# Patient Record
Sex: FEMALE | Race: WHITE | NOT HISPANIC OR LATINO | Employment: FULL TIME | URBAN - METROPOLITAN AREA
[De-identification: names, ages, dates, MRNs, and addresses within clinical notes are randomized per-mention and may not be internally consistent; named-entity substitution may affect disease eponyms.]

---

## 2021-01-26 ENCOUNTER — OFFICE VISIT (OUTPATIENT)
Dept: FAMILY MEDICINE CLINIC | Facility: CLINIC | Age: 66
End: 2021-01-26
Payer: COMMERCIAL

## 2021-01-26 VITALS
WEIGHT: 207.4 LBS | BODY MASS INDEX: 32.55 KG/M2 | DIASTOLIC BLOOD PRESSURE: 80 MMHG | HEIGHT: 67 IN | OXYGEN SATURATION: 98 % | SYSTOLIC BLOOD PRESSURE: 140 MMHG | TEMPERATURE: 97.8 F | HEART RATE: 82 BPM

## 2021-01-26 DIAGNOSIS — Z12.31 ENCOUNTER FOR SCREENING MAMMOGRAM FOR MALIGNANT NEOPLASM OF BREAST: ICD-10-CM

## 2021-01-26 DIAGNOSIS — E55.9 VITAMIN D DEFICIENCY: ICD-10-CM

## 2021-01-26 DIAGNOSIS — E03.9 ACQUIRED HYPOTHYROIDISM: ICD-10-CM

## 2021-01-26 DIAGNOSIS — I10 ESSENTIAL HYPERTENSION: Primary | ICD-10-CM

## 2021-01-26 DIAGNOSIS — E53.8 VITAMIN B12 DEFICIENCY: ICD-10-CM

## 2021-01-26 DIAGNOSIS — E78.2 MIXED HYPERLIPIDEMIA: ICD-10-CM

## 2021-01-26 DIAGNOSIS — J45.20 MILD INTERMITTENT ASTHMA, UNSPECIFIED WHETHER COMPLICATED: ICD-10-CM

## 2021-01-26 PROCEDURE — 99214 OFFICE O/P EST MOD 30 MIN: CPT | Performed by: FAMILY MEDICINE

## 2021-01-26 RX ORDER — FENOFIBRATE 160 MG/1
160 TABLET ORAL DAILY
COMMUNITY
Start: 2020-12-17 | End: 2022-04-13 | Stop reason: SDUPTHER

## 2021-01-26 RX ORDER — LEVOTHYROXINE SODIUM 200 MCG
200 TABLET ORAL DAILY
COMMUNITY
Start: 2021-01-11 | End: 2022-04-13 | Stop reason: SDUPTHER

## 2021-01-26 RX ORDER — LISINOPRIL AND HYDROCHLOROTHIAZIDE 12.5; 1 MG/1; MG/1
1 TABLET ORAL DAILY
COMMUNITY
Start: 2020-12-17 | End: 2022-04-13 | Stop reason: SDUPTHER

## 2021-01-26 RX ORDER — ALBUTEROL SULFATE 90 UG/1
2 AEROSOL, METERED RESPIRATORY (INHALATION) EVERY 6 HOURS PRN
Qty: 3 INHALER | Refills: 0 | Status: SHIPPED | OUTPATIENT
Start: 2021-01-26 | End: 2022-08-01 | Stop reason: SDUPTHER

## 2021-01-26 NOTE — PROGRESS NOTES
Assessment/Plan:     Diagnoses and all orders for this visit:    Essential hypertension  -     Comprehensive metabolic panel; Future  -     Lipid Panel with Direct LDL reflex; Future    Mixed hyperlipidemia  -     Comprehensive metabolic panel; Future  -     Lipid Panel with Direct LDL reflex; Future    Acquired hypothyroidism  -     TSH, 3rd generation; Future    Vitamin D deficiency  -     Vitamin D 25 hydroxy; Future    Vitamin B12 deficiency  -     Vitamin B12; Future    Encounter for screening mammogram for malignant neoplasm of breast  -     Mammo screening bilateral w 3d & cad; Future    Mild intermittent asthma, unspecified whether complicated  -     Advair Diskus 100-50 MCG/DOSE inhaler; Inhale 1 puff 2 (two) times a day Rinse mouth after use  -     albuterol (PROVENTIL HFA,VENTOLIN HFA) 90 mcg/act inhaler; Inhale 2 puffs every 6 (six) hours as needed for wheezing or shortness of breath PROVENTIL PREFERRED    Other orders  -     fenofibrate (TRIGLIDE) 160 MG tablet  -     Discontinue: Advair Diskus 100-50 MCG/DOSE inhaler; TAKE 1 PUFF BY MOUTH EVERY 12 HOURS IN THE MORNING AND IN THE EVENING  -     Synthroid 200 MCG tablet  -     lisinopril-hydrochlorothiazide (PRINZIDE,ZESTORETIC) 10-12 5 MG per tablet          Continue current treatment and encourage lifestyle dietary modification to improve BMI  Formal follow-up after blood work in 6 weeks  Subjective:   Chief Complaint   Patient [facility] with   [address] [facility] [address] [facility]     needs new prescription for albuterol     Bunions    Anxiety      Patient ID: Rei Velasquez is a 63 y o  female  Patient is here to establish care  Diagnoses and medical records discussed  Update on lab work discussed  Refills as appropriate  Patient has areas as noted below  Patient had seen Bora Grubbs MD at [facility]  We do have scanned copies of medical records  Trigger fingers index and middle  Arthritis in feet both  Overdue on pap     Mammogram ordered  Walks and weight training  HTN and hypothyroid  Is compliant with medication  Patient did have 1st COVID vaccination January 10th  She had flu shot September 2020  The following portions of the patient's history were reviewed and updated as appropriate: allergies, current medications, past family history, past medical history, past social history, past surgical history and problem list       Review of Systems   Constitutional: Negative for fatigue and unexpected weight change ( needs to lose)  Genitourinary:        Overdue on Pap   Musculoskeletal: Positive for arthralgias ( trigger finger index and middle also arthritis in both feet  )  Psychiatric/Behavioral: Negative  Negative for dysphoric mood and sleep disturbance  The patient is not nervous/anxious  Objective:      /80   Pulse 82   Temp 97 8 °F (36 6 °C) (Temporal)   Ht 5' 7" (1 702 m)   Wt 94 1 kg (207 lb 6 4 oz)   SpO2 98%   BMI 32 48 kg/m²          Physical Exam  Constitutional:       General: She is not in acute distress  Appearance: Normal appearance  She is well-developed  She is obese  HENT:      Head: Normocephalic  Right Ear: Tympanic membrane normal       Left Ear: Tympanic membrane normal       Nose: Nose normal    Eyes:      Pupils: Pupils are equal, round, and reactive to light  Neck:      Musculoskeletal: Normal range of motion and neck supple  Vascular: No carotid bruit  Cardiovascular:      Rate and Rhythm: Normal rate and regular rhythm  Heart sounds: No murmur  Pulmonary:      Effort: Pulmonary effort is normal       Breath sounds: Normal breath sounds  Abdominal:      General: Bowel sounds are normal       Palpations: Abdomen is soft  There is no mass  Tenderness: There is no abdominal tenderness  Comments: No abdominal or femoral bruit   Skin:     General: Skin is warm and dry     Neurological:      Mental Status: She is alert and oriented to person, place, and time       Deep Tendon Reflexes: Reflexes are normal and symmetric  Psychiatric:         Behavior: Behavior normal          Thought Content:  Thought content normal          Judgment: Judgment normal

## 2021-04-22 LAB
25(OH)D3 SERPL-MCNC: 21 NG/ML (ref 30–100)
ALBUMIN SERPL-MCNC: 4.2 G/DL (ref 3.6–5.1)
ALBUMIN/GLOB SERPL: 1.5 (CALC) (ref 1–2.5)
ALP SERPL-CCNC: 51 U/L (ref 37–153)
ALT SERPL-CCNC: 23 U/L (ref 6–29)
AST SERPL-CCNC: 18 U/L (ref 10–35)
BILIRUB SERPL-MCNC: 0.3 MG/DL (ref 0.2–1.2)
BUN SERPL-MCNC: 21 MG/DL (ref 7–25)
BUN/CREAT SERPL: ABNORMAL (CALC) (ref 6–22)
CALCIUM SERPL-MCNC: 9.7 MG/DL (ref 8.6–10.4)
CHLORIDE SERPL-SCNC: 105 MMOL/L (ref 98–110)
CHOLEST SERPL-MCNC: 225 MG/DL
CHOLEST/HDLC SERPL: 4.3 (CALC)
CO2 SERPL-SCNC: 28 MMOL/L (ref 20–32)
CREAT SERPL-MCNC: 0.72 MG/DL (ref 0.5–0.99)
GLOBULIN SER CALC-MCNC: 2.8 G/DL (CALC) (ref 1.9–3.7)
GLUCOSE SERPL-MCNC: 142 MG/DL (ref 65–99)
HDLC SERPL-MCNC: 52 MG/DL
LDLC SERPL CALC-MCNC: 139 MG/DL (CALC)
NONHDLC SERPL-MCNC: 173 MG/DL (CALC)
POTASSIUM SERPL-SCNC: 4.8 MMOL/L (ref 3.5–5.3)
PROT SERPL-MCNC: 7 G/DL (ref 6.1–8.1)
SL AMB EGFR AFRICAN AMERICAN: 103 ML/MIN/1.73M2
SL AMB EGFR NON AFRICAN AMERICAN: 89 ML/MIN/1.73M2
SODIUM SERPL-SCNC: 138 MMOL/L (ref 135–146)
TRIGL SERPL-MCNC: 207 MG/DL
TSH SERPL-ACNC: 0.23 MIU/L (ref 0.4–4.5)
VIT B12 SERPL-MCNC: 467 PG/ML (ref 200–1100)

## 2021-04-27 ENCOUNTER — TELEPHONE (OUTPATIENT)
Dept: FAMILY MEDICINE CLINIC | Facility: CLINIC | Age: 66
End: 2021-04-27

## 2021-04-27 ENCOUNTER — ANNUAL EXAM (OUTPATIENT)
Dept: FAMILY MEDICINE CLINIC | Facility: CLINIC | Age: 66
End: 2021-04-27
Payer: COMMERCIAL

## 2021-04-27 VITALS
HEIGHT: 67 IN | OXYGEN SATURATION: 97 % | RESPIRATION RATE: 16 BRPM | WEIGHT: 206.4 LBS | DIASTOLIC BLOOD PRESSURE: 74 MMHG | BODY MASS INDEX: 32.39 KG/M2 | SYSTOLIC BLOOD PRESSURE: 140 MMHG | TEMPERATURE: 97.8 F | HEART RATE: 78 BPM

## 2021-04-27 DIAGNOSIS — Z01.419 ENCOUNTER FOR GYNECOLOGICAL EXAMINATION WITH PAPANICOLAOU SMEAR OF CERVIX: ICD-10-CM

## 2021-04-27 DIAGNOSIS — Z00.00 WELL ADULT HEALTH CHECK: Primary | ICD-10-CM

## 2021-04-27 DIAGNOSIS — Z12.11 SCREENING FOR COLORECTAL CANCER: ICD-10-CM

## 2021-04-27 DIAGNOSIS — Z12.12 SCREENING FOR COLORECTAL CANCER: ICD-10-CM

## 2021-04-27 DIAGNOSIS — R73.01 ELEVATED FASTING GLUCOSE: ICD-10-CM

## 2021-04-27 PROCEDURE — 1036F TOBACCO NON-USER: CPT | Performed by: FAMILY MEDICINE

## 2021-04-27 PROCEDURE — 99396 PREV VISIT EST AGE 40-64: CPT | Performed by: FAMILY MEDICINE

## 2021-04-27 NOTE — PROGRESS NOTES
[facility] PRIMARY CARE    NAME: Joshua Gerardo  AGE: 63 y o  SEX: female  : 1955     DATE: 2021     Assessment and Plan:     Problem List Items Addressed This Visit     None      Visit Diagnoses     Well adult health check    -  Primary    Screening for colorectal cancer        Relevant Orders    Ambulatory referral to Gastroenterology    Encounter for gynecological examination with Papanicolaou smear of cervix        Relevant Orders    THINPREP (REFL) HPV  RFX GENOTYPE (Completed)    Elevated fasting glucose        Relevant Orders    HEMOGLOBIN A1C W/ EAG ESTIMATION          Immunizations and preventive care screenings were discussed with patient today  Appropriate education was printed on patient's after visit summary  Counseling:  Alcohol/drug use: discussed moderation in alcohol intake, the recommendations for healthy alcohol use, and avoidance of illicit drug use  Dental Health: discussed importance of regular tooth brushing, flossing, and dental visits  Injury prevention: discussed safety/seat belts, safety helmets, smoke detectors, carbon dioxide detectors, and smoking near bedding or upholstery  Sexual health: discussed sexually transmitted diseases, partner selection, use of condoms, avoidance of unintended pregnancy, and contraceptive alternatives  · Exercise: the importance of regular exercise/physical activity was discussed  Recommend exercise 3-5 times per week for at least 30 minutes  · Discussed 1500 calorie dietary intake with focusing on decrease carb portion  BMI Counseling: Body mass index is 32 33 kg/m²   The BMI is above normal  Nutrition recommendations include decreasing portion sizes, encouraging healthy choices of fruits and vegetables, decreasing fast food intake, consuming healthier snacks, limiting drinks that contain sugar, moderation in carbohydrate intake, increasing intake of lean protein, reducing intake of saturated and trans fat and reducing intake of cholesterol  Exercise recommendations include vigorous physical activity 75 minutes/week  Return in about 3 months (around 7/27/2021) for Recheck  Chief Complaint:     Chief Complaint   Patient presents with    Well Check     Gyn exam       History of Present Illness:     Adult Annual Physical   Patient here for a comprehensive physical exam  The patient reports no problems  Labs reviewed  Vitamin-D low and patient has been instructed on supplementation  Lipid profile with elevated triglycerides and only modest HDL  Total cholesterol and LDL slightly elevated  TSH 0 23 will keep levothyroxine same  Patient is on antihypertensive medication as well as fenofibrate for dyslipidemia  This is the patient's 2nd time seeing me  Previously followed by [facility]  Patient has received both COVID vaccination  Diet and Physical Activity  · Diet/Nutrition: well balanced diet and just eats too much  · Exercise: walking and 5-7 times a week on average  Depression Screening  PHQ-9 Depression Screening    PHQ-9:   Frequency of the following problems over the past two weeks:           General Health  · Sleep: sleeps well  · Hearing: normal - bilateral   · Vision: most recent eye exam >1 year ago  · Dental: regular dental visits  /GYN Health  · Patient is: postmenopausal still considering updating gyn visit  Mammogram has been ordered to be updated  · Last menstrual period: 10 years ago  · Contraceptive method: NA      Review of Systems:     Review of Systems   Constitutional: Negative for unexpected weight change (Needs to enhance fitness and dietary)  Genitourinary: Negative for menstrual problem (Postmenopausal)        Past Medical History:     Past Medical History:   Diagnosis Date    Allergic     Anxiety     Arthritis     both feet      Past Surgical History:     Past Surgical History: Procedure Laterality Date     SECTION  08/10/1991      Social History:     E-Cigarette/Vaping    E-Cigarette Use Never User      E-Cigarette/Vaping Substances    Nicotine No     THC No     CBD No     Flavoring No     Other No     Unknown No      Social History     Socioeconomic History    Marital status: Unknown     Spouse name: None    Number of children: None    Years of education: None    Highest education level: None   Occupational History    None   Social Needs    Financial resource strain: None    Food insecurity     Worry: None     Inability: None    Transportation needs     Medical: None     Non-medical: None   Tobacco Use    Smoking status: Never Smoker    Smokeless tobacco: Never Used   Substance and Sexual Activity    Alcohol use:  Yes     Alcohol/week: 1 0 standard drinks     Types: 1 Shots of liquor per week     Frequency: Monthly or less    Drug use: Never    Sexual activity: Yes     Partners: Male   Lifestyle    Physical activity     Days per week: None     Minutes per session: None    Stress: None   Relationships    Social connections     Talks on phone: None     Gets together: None     Attends [employer] service: None     Active member of club or organization: None     Attends meetings of clubs or organizations: None     Relationship status: None    Intimate partner violence     Fear of current or ex partner: None     Emotionally abused: None     Physically abused: None     Forced sexual activity: None   Other Topics Concern    None   Social History Narrative    None      Family History:     Family History   Problem Relation Age of Onset    Hypertension Mother     COPD Mother     Hypertension Father     Autism spectrum disorder Brother     Hearing loss Brother     Diabetes Maternal Grandfather     Stomach cancer Paternal Grandmother       Current Medications:     Current Outpatient Medications   Medication Sig Dispense Refill    Advair Diskus 100-50 MCG/DOSE inhaler Inhale 1 puff 2 (two) times a day Rinse mouth after use  3 Inhaler 0    albuterol (PROVENTIL HFA,VENTOLIN HFA) 90 mcg/act inhaler Inhale 2 puffs every 6 (six) hours as needed for wheezing or shortness of breath PROVENTIL PREFERRED 3 Inhaler 0    fenofibrate (TRIGLIDE) 160 MG tablet Take 160 mg by mouth daily       lisinopril-hydrochlorothiazide (PRINZIDE,ZESTORETIC) 10-12 5 MG per tablet Take 1 tablet by mouth daily       Synthroid 200 MCG tablet Take 200 mcg by mouth daily        No current facility-administered medications for this visit  Allergies:     No Known Allergies   Physical Exam:     /74   Pulse 78   Temp 97 8 °F (36 6 °C) (Temporal)   Resp 16   Ht 5' 7" (1 702 m)   Wt 93 6 kg (206 lb 6 4 oz)   SpO2 97%   BMI 32 33 kg/m²   OB History        2    Para   2    Term                AB        Living           SAB        TAB        Ectopic        Multiple        Live Births               Obstetric Comments     Menarche=16             Physical Exam  Vitals signs and nursing note reviewed  Constitutional:       General: She is not in acute distress  Appearance: Normal appearance  She is well-developed  She is obese  HENT:      Head: Normocephalic and atraumatic  Eyes:      Conjunctiva/sclera: Conjunctivae normal    Neck:      Musculoskeletal: Neck supple  Cardiovascular:      Rate and Rhythm: Normal rate and regular rhythm  Heart sounds: No murmur  Pulmonary:      Effort: Pulmonary effort is normal  No respiratory distress  Breath sounds: Normal breath sounds  Abdominal:      Palpations: Abdomen is soft  Tenderness: There is no abdominal tenderness  Genitourinary:     General: Normal vulva  Rectum: Normal       Comments: External genitalia unremarkable urethral meatus is unremarkable  There is vaginal wall prolapse noted with possible cystocele and rectocele which is asymptomatic  Cervix is normal Pap obtained  Bimanual exam unremarkable  Skin:     General: Skin is warm and dry  Neurological:      Mental Status: She is alert and oriented to person, place, and time            DO JOANNE Lara [facility] PRIMARY CARE

## 2021-04-27 NOTE — PROGRESS NOTES
BMI Counseling: Body mass index is 32 33 kg/m²  The BMI is above normal  Nutrition recommendations include reducing portion sizes, decreasing overall calorie intake, 3-5 servings of fruits/vegetables daily, reducing fast food intake, consuming healthier snacks, decreasing soda and/or juice intake, moderation in carbohydrate intake, increasing intake of lean protein, reducing intake of saturated fat and trans fat and reducing intake of cholesterol  Exercise recommendations include exercising 3-5 times per week

## 2021-04-27 NOTE — TELEPHONE ENCOUNTER
----- Message from Donn Tidwell DO sent at 8/25/5920 11:06 AM EDT -----  Regarding: colonoscopy results  Call [facility] to obtain results of the colonoscopy done about 8 years ago

## 2021-04-29 LAB
CLINICAL INFO: NORMAL
DATE PREVIOUS BX: NORMAL
LMP START DATE: NORMAL
SL AMB PREV. PAP:: NORMAL
SPECIMEN SOURCE CVX/VAG CYTO: NORMAL
STAT OF ADQ CVX/VAG CYTO-IMP: NORMAL

## 2021-09-22 ENCOUNTER — TELEPHONE (OUTPATIENT)
Dept: GASTROENTEROLOGY | Facility: CLINIC | Age: 66
End: 2021-09-22

## 2021-09-22 NOTE — TELEPHONE ENCOUNTER
PASSED OA  COLONOSCOPY SCHEDULED 12/17/21 @[place] with Dr Jodi Livingston  Dulcolax/miralax instructions given to pt verbally/mailed  O09/22/21  Screened by: Karen Cintron    Referring Provider Melita    Pre- Screening: There is no height or weight on file to calculate BMI  Has patient been referred for a routine screening Colonoscopy? yes  Is the patient between 39-70 years old? yes      Previous Colonoscopy yes   If yes:    Date: 8 yrs ago    Facility:     Reason:       SCHEDULING STAFF: If the patient is between 45yrs-49yrs, please advise patient to confirm benefits/coverage with their insurance company for a routine screening colonoscopy, some insurance carriers will only cover at [address] or older  If the patient is over 66years old, please schedule an office visit  Does the patient want to see a Gastroenterologist prior to their procedure OR are they having any GI symptoms? no    Has the patient been hospitalized or had abdominal surgery in the past 6 months? no    Does the patient use supplemental oxygen? no    Does the patient take Coumadin, Lovenox, Plavix, Elliquis, Xarelto, or other blood thinning medication? no    Has the patient had a stroke, cardiac event, or stent placed in the past year? no    SCHEDULING STAFF: If patient answers NO to above questions, then schedule procedure  If patient answers YES to above questions, then schedule office appointment  If patient is between 45yrs - 49yrs, please advise patient that we will have to confirm benefits & coverage with their insurance company for a routine screening colonoscopy

## 2021-09-30 ENCOUNTER — RA CDI HCC (OUTPATIENT)
Dept: OTHER | Facility: HOSPITAL | Age: 66
End: 2021-09-30

## 2021-09-30 NOTE — PROGRESS NOTES
[facility]  coding opportunities       Chart reviewed, no opportunity found: CHART REVIEWED, NO OPPORTUNITY FOUND                        Patients insurance company: [facility] [address]  (Medicare Advantage and Commercial)

## 2021-12-08 ENCOUNTER — TELEPHONE (OUTPATIENT)
Dept: GASTROENTEROLOGY | Facility: MEDICAL CENTER | Age: 66
End: 2021-12-08

## 2021-12-09 ENCOUNTER — TELEPHONE (OUTPATIENT)
Dept: FAMILY MEDICINE CLINIC | Facility: CLINIC | Age: 66
End: 2021-12-09

## 2021-12-13 ENCOUNTER — OFFICE VISIT (OUTPATIENT)
Dept: FAMILY MEDICINE CLINIC | Facility: CLINIC | Age: 66
End: 2021-12-13
Payer: COMMERCIAL

## 2021-12-13 VITALS
DIASTOLIC BLOOD PRESSURE: 78 MMHG | OXYGEN SATURATION: 98 % | BODY MASS INDEX: 31.89 KG/M2 | RESPIRATION RATE: 16 BRPM | TEMPERATURE: 97.1 F | HEART RATE: 92 BPM | SYSTOLIC BLOOD PRESSURE: 142 MMHG | HEIGHT: 67 IN | WEIGHT: 203.2 LBS

## 2021-12-13 DIAGNOSIS — E03.9 ACQUIRED HYPOTHYROIDISM: ICD-10-CM

## 2021-12-13 DIAGNOSIS — I10 ESSENTIAL HYPERTENSION: Primary | ICD-10-CM

## 2021-12-13 DIAGNOSIS — E11.9 TYPE 2 DIABETES MELLITUS WITHOUT COMPLICATION, WITHOUT LONG-TERM CURRENT USE OF INSULIN (HCC): ICD-10-CM

## 2021-12-13 DIAGNOSIS — E78.2 MIXED HYPERLIPIDEMIA: ICD-10-CM

## 2021-12-13 PROCEDURE — 99214 OFFICE O/P EST MOD 30 MIN: CPT | Performed by: FAMILY MEDICINE

## 2021-12-13 RX ORDER — LORATADINE 10 MG/1
10 TABLET ORAL DAILY
COMMUNITY

## 2021-12-13 NOTE — PROGRESS NOTES
Assessment/Plan:     Diagnoses and all orders for this visit:    Essential hypertension  -     Comprehensive metabolic panel; Future    Mixed hyperlipidemia  -     Comprehensive metabolic panel; Future    Type 2 diabetes mellitus without complication, without long-term current use of insulin (HCC)  -     metFORMIN (GLUCOPHAGE) 500 mg tablet; Take 1 tablet (500 mg total) by mouth daily with breakfast  -     HEMOGLOBIN A1C W/ EAG ESTIMATION; Future    Acquired hypothyroidism  -     TSH, 3rd generation; Future    Other orders  -     loratadine (CLARITIN) 10 mg tablet; Take 10 mg by mouth daily        Discussed starting metformin  Patient in agreement  Continue all other current prescription meds  Continue efforts at fitness and dietary compliance and recheck in 3 months  Subjective:   Chief Complaint   Patient presents with    Follow-up     Pt is here for follow up for chronic medical conditions  Pt did receive Covid booster       Patient ID: Jarod Ledbetter is a 61 y o  female  79-year-old female who is here for chronic medical condition follow-up  Unfortunately she has had a difficult time getting motivated to achieve fitness and dietary goals due to personal home stressor  Has not had any time for herself  Partner has been ill for 11 plus weeks-multiple issues  trauma, spleen, SIRS  Things are settling down a little bit and patient will try to get back in the groove with correcting and achieving healthier lifestyle  Patient has an upcoming colonoscopy on December 17th  Reviewed blood work from December 11  Hemoglobin A1c 7 2% representing type 2 diabetes  Lipids not improved from November 2017    The following portions of the patient's history were reviewed and updated as appropriate: allergies, current medications, past family history, past medical history, past social history, past surgical history and problem list       Review of Systems      Objective:    Component      Latest Ref Rng & Units 12/11/2021   Hemoglobin A1C      <5 7 % 7 2 (H)   eAG, EST AVG Glucose      <154 mg/dL 160 (H)     /78   Pulse 92   Temp (!) 97 1 °F (36 2 °C) (Temporal)   Resp 16   Ht 5' 7" (1 702 m)   Wt 92 2 kg (203 lb 3 2 oz)   SpO2 98%   BMI 31 83 kg/m²      2 d ago Comments    Cholesterol <200 mg/dL 223 High  was 225    Triglyceride <150 mg/dL 193 High  was 207    Cholesterol, HDL, Direct >40 mg/dL 57     Cholesterol, Non-HDL <160 mg/dL 166 High  was 173 Note: For NCEP interpretive guidelines please refer to the Laboratory Handbook  CHOL/HDL Ratio  3 91  Relative Risk   1/2 Average Risk          3 27   Average Risk              4 44   2X Average Risk           7 05   3X Average Risk          11 04   Cholesterol, LDL, Calculated <130 mg/dL 127 was 139         Ref Range & Units 2 d ago Comments   Vitamin D, 25-OH 30 - 100 ng/mL 16 Low  WAS 21 in April         Physical Exam  Constitutional:       General: She is not in acute distress  Appearance: Normal appearance  She is well-developed  She is obese  HENT:      Head: Normocephalic  Right Ear: Tympanic membrane normal       Left Ear: Tympanic membrane normal       Nose: Nose normal       Mouth/Throat:      Mouth: Mucous membranes are moist    Eyes:      Pupils: Pupils are equal, round, and reactive to light  Neck:      Vascular: No carotid bruit  Cardiovascular:      Rate and Rhythm: Normal rate and regular rhythm  Pulses: no weak pulses          Dorsalis pedis pulses are 2+ on the right side and 2+ on the left side  Posterior tibial pulses are 2+ on the right side and 2+ on the left side  Heart sounds: No murmur heard  Pulmonary:      Effort: Pulmonary effort is normal       Breath sounds: Normal breath sounds  Abdominal:      General: Bowel sounds are normal       Palpations: Abdomen is soft  There is no mass  Tenderness: There is no abdominal tenderness     Feet:      Right foot:      Skin integrity: No ulcer, skin breakdown, erythema, warmth, callus or dry skin  Left foot:      Skin integrity: No ulcer, skin breakdown, erythema, warmth, callus or dry skin  Skin:     General: Skin is warm and dry  Neurological:      Mental Status: She is alert and oriented to person, place, and time  Deep Tendon Reflexes: Reflexes are normal and symmetric  Psychiatric:         Mood and Affect: Anxious: Slight  Behavior: Behavior normal          Thought Content: Thought content normal          Cognition and Memory: Cognition normal        Patient's shoes and socks removed  Right Foot/Ankle   Right Foot Inspection  Skin Exam: skin normal and skin intact  No dry skin, no warmth, no callus, no erythema, no maceration, no abnormal color, no pre-ulcer, no ulcer and no callus  Toe Exam: ROM and strength within normal limits  Sensory   Monofilament testing: intact    Vascular  The right DP pulse is 2+  The right PT pulse is 2+  Left Foot/Ankle  Left Foot Inspection  Skin Exam: skin normal and skin intact  No dry skin, no warmth, no erythema, no maceration, normal color, no pre-ulcer, no ulcer and no callus  Toe Exam: ROM and strength within normal limits  Sensory   Monofilament testing: intact    Vascular  The left DP pulse is 2+  The left PT pulse is 2+       Assign Risk Category  No deformity present  No loss of protective sensation  No weak pulses

## 2021-12-16 ENCOUNTER — TELEPHONE (OUTPATIENT)
Dept: GASTROENTEROLOGY | Facility: MEDICAL CENTER | Age: 66
End: 2021-12-16

## 2021-12-17 ENCOUNTER — ANESTHESIA (OUTPATIENT)
Dept: GASTROENTEROLOGY | Facility: MEDICAL CENTER | Age: 66
End: 2021-12-17

## 2021-12-17 ENCOUNTER — ANESTHESIA EVENT (OUTPATIENT)
Dept: GASTROENTEROLOGY | Facility: MEDICAL CENTER | Age: 66
End: 2021-12-17

## 2021-12-17 ENCOUNTER — HOSPITAL ENCOUNTER (OUTPATIENT)
Dept: GASTROENTEROLOGY | Facility: MEDICAL CENTER | Age: 66
Setting detail: OUTPATIENT SURGERY
Discharge: HOME | End: 2021-12-17
Payer: COMMERCIAL

## 2021-12-17 VITALS
RESPIRATION RATE: 20 BRPM | SYSTOLIC BLOOD PRESSURE: 121 MMHG | BODY MASS INDEX: 30.77 KG/M2 | HEIGHT: 68 IN | DIASTOLIC BLOOD PRESSURE: 59 MMHG | OXYGEN SATURATION: 100 % | HEART RATE: 68 BPM | TEMPERATURE: 97.5 F | WEIGHT: 203 LBS

## 2021-12-17 DIAGNOSIS — Z12.11 SPECIAL SCREENING FOR MALIGNANT NEOPLASMS, COLON: ICD-10-CM

## 2021-12-17 PROBLEM — E03.9 HYPOTHYROIDISM: Status: ACTIVE | Noted: 2021-12-17

## 2021-12-17 PROBLEM — F41.9 ANXIETY: Status: ACTIVE | Noted: 2021-12-17

## 2021-12-17 PROBLEM — J45.909 ASTHMA: Status: ACTIVE | Noted: 2021-12-17

## 2021-12-17 PROBLEM — E78.5 HYPERLIPIDEMIA: Status: ACTIVE | Noted: 2021-12-17

## 2021-12-17 PROBLEM — I10 HTN (HYPERTENSION): Status: ACTIVE | Noted: 2021-12-17

## 2021-12-17 PROBLEM — E11.9 DIABETES MELLITUS, TYPE 2 (HCC): Status: ACTIVE | Noted: 2021-12-17

## 2021-12-17 PROCEDURE — 88305 TISSUE EXAM BY PATHOLOGIST: CPT | Performed by: PATHOLOGY

## 2021-12-17 PROCEDURE — 88341 IMHCHEM/IMCYTCHM EA ADD ANTB: CPT | Performed by: PATHOLOGY

## 2021-12-17 PROCEDURE — 45380 COLONOSCOPY AND BIOPSY: CPT | Performed by: INTERNAL MEDICINE

## 2021-12-17 PROCEDURE — 45385 COLONOSCOPY W/LESION REMOVAL: CPT | Performed by: INTERNAL MEDICINE

## 2021-12-17 PROCEDURE — 88342 IMHCHEM/IMCYTCHM 1ST ANTB: CPT | Performed by: PATHOLOGY

## 2021-12-17 RX ORDER — SODIUM CHLORIDE 9 MG/ML
125 INJECTION, SOLUTION INTRAVENOUS CONTINUOUS
Status: DISCONTINUED | OUTPATIENT
Start: 2021-12-17 | End: 2021-12-21 | Stop reason: HOSPADM

## 2021-12-17 RX ORDER — PROPOFOL 10 MG/ML
INJECTION, EMULSION INTRAVENOUS AS NEEDED
Status: DISCONTINUED | OUTPATIENT
Start: 2021-12-17 | End: 2021-12-17

## 2021-12-17 RX ADMIN — PROPOFOL 100 MG: 10 INJECTION, EMULSION INTRAVENOUS at 09:44

## 2021-12-17 RX ADMIN — PROPOFOL 120 MG: 10 INJECTION, EMULSION INTRAVENOUS at 09:39

## 2021-12-17 RX ADMIN — PROPOFOL 30 MG: 10 INJECTION, EMULSION INTRAVENOUS at 09:42

## 2021-12-17 RX ADMIN — PROPOFOL 50 MG: 10 INJECTION, EMULSION INTRAVENOUS at 09:49

## 2021-12-17 RX ADMIN — SODIUM CHLORIDE 125 ML/HR: 0.9 INJECTION, SOLUTION INTRAVENOUS at 09:06

## 2021-12-17 RX ADMIN — PROPOFOL 50 MG: 10 INJECTION, EMULSION INTRAVENOUS at 09:41

## 2021-12-17 RX ADMIN — PROPOFOL 50 MG: 10 INJECTION, EMULSION INTRAVENOUS at 09:54

## 2022-03-10 ENCOUNTER — RA CDI HCC (OUTPATIENT)
Dept: OTHER | Facility: HOSPITAL | Age: 67
End: 2022-03-10

## 2022-03-10 NOTE — PROGRESS NOTES
[facility]  coding opportunities       Chart reviewed, no opportunity found: CHART REVIEWED, NO OPPORTUNITY FOUND                        Patients insurance company: [facility] [address]  (Medicare Advantage and Commercial)

## 2022-04-13 ENCOUNTER — ANNUAL EXAM (OUTPATIENT)
Dept: FAMILY MEDICINE CLINIC | Facility: CLINIC | Age: 67
End: 2022-04-13
Payer: COMMERCIAL

## 2022-04-13 VITALS
SYSTOLIC BLOOD PRESSURE: 130 MMHG | DIASTOLIC BLOOD PRESSURE: 80 MMHG | TEMPERATURE: 97.5 F | BODY MASS INDEX: 32.56 KG/M2 | WEIGHT: 202.6 LBS | HEART RATE: 79 BPM | HEIGHT: 66 IN | RESPIRATION RATE: 16 BRPM | OXYGEN SATURATION: 98 %

## 2022-04-13 DIAGNOSIS — E78.2 MODERATE MIXED HYPERLIPIDEMIA NOT REQUIRING STATIN THERAPY: ICD-10-CM

## 2022-04-13 DIAGNOSIS — I10 PRIMARY HYPERTENSION: ICD-10-CM

## 2022-04-13 DIAGNOSIS — F40.243 FEAR OF FLYING: ICD-10-CM

## 2022-04-13 DIAGNOSIS — Z12.31 ENCOUNTER FOR SCREENING MAMMOGRAM FOR BREAST CANCER: ICD-10-CM

## 2022-04-13 DIAGNOSIS — E03.9 ACQUIRED HYPOTHYROIDISM: ICD-10-CM

## 2022-04-13 DIAGNOSIS — Z01.419 ENCOUNTER FOR GYNECOLOGICAL EXAMINATION WITH PAPANICOLAOU SMEAR OF CERVIX: Primary | ICD-10-CM

## 2022-04-13 DIAGNOSIS — E11.9 TYPE 2 DIABETES MELLITUS WITHOUT COMPLICATION, WITHOUT LONG-TERM CURRENT USE OF INSULIN (HCC): ICD-10-CM

## 2022-04-13 DIAGNOSIS — J45.20 MILD INTERMITTENT ASTHMA, UNSPECIFIED WHETHER COMPLICATED: ICD-10-CM

## 2022-04-13 PROBLEM — M72.2 PLANTAR FASCIITIS, RIGHT: Status: ACTIVE | Noted: 2021-09-30

## 2022-04-13 PROCEDURE — 99213 OFFICE O/P EST LOW 20 MIN: CPT | Performed by: FAMILY MEDICINE

## 2022-04-13 PROCEDURE — 3075F SYST BP GE 130 - 139MM HG: CPT | Performed by: FAMILY MEDICINE

## 2022-04-13 PROCEDURE — 3008F BODY MASS INDEX DOCD: CPT | Performed by: FAMILY MEDICINE

## 2022-04-13 PROCEDURE — 3079F DIAST BP 80-89 MM HG: CPT | Performed by: FAMILY MEDICINE

## 2022-04-13 PROCEDURE — S0612 ANNUAL GYNECOLOGICAL EXAMINA: HCPCS | Performed by: FAMILY MEDICINE

## 2022-04-13 PROCEDURE — G0145 SCR C/V CYTO,THINLAYER,RESCR: HCPCS | Performed by: FAMILY MEDICINE

## 2022-04-13 PROCEDURE — 1036F TOBACCO NON-USER: CPT | Performed by: FAMILY MEDICINE

## 2022-04-13 RX ORDER — FENOFIBRATE 160 MG/1
160 TABLET ORAL DAILY
Qty: 90 TABLET | Refills: 1 | Status: SHIPPED | OUTPATIENT
Start: 2022-04-13 | End: 2022-08-01 | Stop reason: SDUPTHER

## 2022-04-13 RX ORDER — LISINOPRIL AND HYDROCHLOROTHIAZIDE 12.5; 1 MG/1; MG/1
1 TABLET ORAL DAILY
Qty: 90 TABLET | Refills: 1 | Status: SHIPPED | OUTPATIENT
Start: 2022-04-13 | End: 2022-08-01 | Stop reason: SDUPTHER

## 2022-04-13 RX ORDER — LEVOTHYROXINE SODIUM 200 MCG
200 TABLET ORAL DAILY
Qty: 90 TABLET | Refills: 1 | Status: SHIPPED | OUTPATIENT
Start: 2022-04-13 | End: 2022-08-01 | Stop reason: SDUPTHER

## 2022-04-13 RX ORDER — LORAZEPAM 0.5 MG/1
0.5 TABLET ORAL EVERY 8 HOURS PRN
Qty: 10 TABLET | Refills: 0 | Status: SHIPPED | OUTPATIENT
Start: 2022-04-13

## 2022-04-13 NOTE — PROGRESS NOTES
Chief Complaint   Patient presents with    Gynecologic Exam     Pt needs script for Mammogram     Follow-up     pt needs refills on medications    Subjective      Stevan Diaz is a 61 y o  female who presents for annual exam  The patient has no complaints today  The patient is sexually active  GYN screening history: last pap: was normal and approximate date  and was normal  The patient is not taking hormone replacement therapy  Patient denies post-menopausal vaginal bleeding    The patient wears seatbelts: yes  The patient participates in regular exercise: walking  Has the patient ever been transfused or tattooed?: no  The patient reports that there is not domestic violence in her life  Stress at home with  who is ill  They are planning a trip to [facility] ([place], [place]) ASSIA time Nancy tierney  's doctor had told him that he is okay to go on the trip  Patient obviously is anxious about this  Her  Menstrual History:  OB History        2    Para   2    Term                AB        Living           SAB        IAB        Ectopic        Multiple        Live Births               Obstetric Comments     Menarche=16              Menarche age: 17  No LMP recorded  Patient is postmenopausal        The following portions of the patient's history were reviewed and updated as appropriate: allergies, current medications, past family history, past medical history, past social history, past surgical history and problem list       Review of Systems  Constitutional: positive for Extreme stress and frustration with her  who continues with his significant illness    Ears, nose, mouth, throat, and face: negative  Cardiovascular: negative, Intermittent anxiety palpitations  Gastrointestinal: negative, Generalized aches and pains likely stress  Musculoskeletal:negative  Neurological: negative  Behavioral/Psych: positive for anxiety, decreased appetite and fatigue   Patient has been making great efforts at dietary changes as well as weight loss  She is a little disappointed today discussing the hemoglobin A1c that actually went up a little bit  I did relate that certainly stressed can impact blood sugar control     Objective:  Lab review      /80   Pulse 79   Temp 97 5 °F (36 4 °C) (Temporal)   Resp 16   Ht 5' 6 14" (1 68 m)   Wt 91 9 kg (202 lb 9 6 oz)   SpO2 98%   BMI 32 56 kg/m²     General:   alert and oriented, in no acute distress, cooperative and Stressed   Heart: regular rate and rhythm, S1, S2 normal, no murmur, click, rub or gallop   Lungs: clear to auscultation bilaterally   Abdomen: soft, non-tender, without masses or organomegaly and no masses palpated   Vulva: normal   Vagina: normal mucosa   Cervix: multiparous appearance   Uterus: normal size   Adnexa: normal adnexa      Breast exam is unremarkable bilateral without changes in axilla or nipple structure  Assessment/Plan  Leah Hough was seen today for gynecologic exam and follow-up  Diagnoses and all orders for this visit:    Encounter for gynecological examination with Papanicolaou smear of cervix  -     Liquid-based pap, screening    Type 2 diabetes mellitus without complication, without long-term current use of insulin (HCC)  -     metFORMIN (GLUCOPHAGE) 500 mg tablet; Take 1 tablet (500 mg total) by mouth daily with breakfast    Encounter for screening mammogram for breast cancer    Fear of flying  -     LORazepam (Ativan) 0 5 mg tablet; Take 1 tablet (0 5 mg total) by mouth every 8 (eight) hours as needed (Fear of flying)    Mild intermittent asthma, unspecified whether complicated  -     Advair Diskus 100-50 MCG/DOSE inhaler; Inhale 1 puff 2 (two) times a day Rinse mouth after use  Moderate mixed hyperlipidemia not requiring statin therapy  -     fenofibrate 160 MG tablet;  Take 1 tablet (160 mg total) by mouth daily    Primary hypertension  -     lisinopril-hydrochlorothiazide (PRINZIDE,ZESTORETIC) 10-12 5 MG per tablet; Take 1 tablet by mouth daily    Acquired hypothyroidism  -     Synthroid 200 MCG tablet;  Take 1 tablet (200 mcg total) by mouth daily

## 2022-04-13 NOTE — PROGRESS NOTES
Diabetic Foot Exam    Patient's shoes and socks removed  Right Foot/Ankle   Right Foot Inspection  Skin Exam: skin normal and skin intact  No dry skin, no warmth, no callus, no erythema, no maceration, no abnormal color, no pre-ulcer, no ulcer and no callus  Toe Exam: ROM and strength within normal limits  Sensory   Monofilament testing: intact    Vascular  The right DP pulse is 2+  The right PT pulse is 2+  Left Foot/Ankle  Left Foot Inspection  Skin Exam: skin normal and skin intact  No dry skin, no warmth, no erythema, no maceration, normal color, no pre-ulcer, no ulcer and no callus  Toe Exam: ROM and strength within normal limits  Sensory   Monofilament testing: intact    Vascular  The left DP pulse is 2+  The left PT pulse is 2+       Assign Risk Category  No deformity present  No loss of protective sensation  No weak pulses  Risk: 0

## 2022-04-20 LAB
LAB AP GYN PRIMARY INTERPRETATION: NORMAL
LAB AP LMP: NORMAL
Lab: NORMAL

## 2022-05-05 ENCOUNTER — TELEPHONE (OUTPATIENT)
Dept: FAMILY MEDICINE CLINIC | Facility: CLINIC | Age: 67
End: 2022-05-05

## 2022-05-05 NOTE — TELEPHONE ENCOUNTER
I put an order in for curbside COVID testing that can be done here, await results and discussion of antiviral medications pending

## 2022-05-05 NOTE — TELEPHONE ENCOUNTER
Patient was exposed to covid on Monday, she doesn't have any symptoms as of now  She is worried due to her  being immuno compromised  Would we be able to order a covid test for her and when should she get the test done and where  She is also asking about the Antiviral medication that came on the market for covid if she would be positive  Please advise patient at [phone]

## 2022-05-06 ENCOUNTER — CLINICAL SUPPORT (OUTPATIENT)
Dept: FAMILY MEDICINE CLINIC | Facility: CLINIC | Age: 67
End: 2022-05-06

## 2022-05-06 DIAGNOSIS — Z20.822 EXPOSURE TO COVID-19 VIRUS: Primary | ICD-10-CM

## 2022-05-06 PROCEDURE — U0005 INFEC AGEN DETEC AMPLI PROBE: HCPCS | Performed by: FAMILY MEDICINE

## 2022-05-06 PROCEDURE — U0003 INFECTIOUS AGENT DETECTION BY NUCLEIC ACID (DNA OR RNA); SEVERE ACUTE RESPIRATORY SYNDROME CORONAVIRUS 2 (SARS-COV-2) (CORONAVIRUS DISEASE [COVID-19]), AMPLIFIED PROBE TECHNIQUE, MAKING USE OF HIGH THROUGHPUT TECHNOLOGIES AS DESCRIBED BY CMS-2020-01-R: HCPCS | Performed by: FAMILY MEDICINE

## 2022-07-02 ENCOUNTER — NURSE TRIAGE (OUTPATIENT)
Dept: OTHER | Facility: OTHER | Age: 67
End: 2022-07-02

## 2022-07-03 ENCOUNTER — OFFICE VISIT (OUTPATIENT)
Dept: URGENT CARE | Facility: MEDICAL CENTER | Age: 67
End: 2022-07-03
Payer: COMMERCIAL

## 2022-07-03 VITALS
DIASTOLIC BLOOD PRESSURE: 81 MMHG | WEIGHT: 202 LBS | HEIGHT: 66 IN | OXYGEN SATURATION: 97 % | SYSTOLIC BLOOD PRESSURE: 167 MMHG | HEART RATE: 74 BPM | TEMPERATURE: 97.9 F | BODY MASS INDEX: 32.47 KG/M2

## 2022-07-03 DIAGNOSIS — U07.1 COVID-19: Primary | ICD-10-CM

## 2022-07-03 PROCEDURE — G0382 LEV 3 HOSP TYPE B ED VISIT: HCPCS | Performed by: EMERGENCY MEDICINE

## 2022-07-03 NOTE — TELEPHONE ENCOUNTER
Reason for Disposition   [1] COVID-19 diagnosed by positive lab test (e g , PCR, rapid self-test kit) AND [2] mild symptoms (e g , cough, fever, others) AND [5] no complications or SOB    Answer Assessment - Initial Assessment Questions  Were you within 6 feet or less, for up to 15 minutes or more with a person that has a confirmed COVID-19 test?            n/a       What was the date of your exposure?            n/a       Are you experiencing any symptoms attributed to the virus?  (Assess for SOB, cough, fever, difficulty breathing)       Covid + on 7/2 7/1: Symptomatic: Sore throat, Nasal congestion, mild wheezing             HIGH RISK: Do you have any history heart or lung conditions, weakened immune system, diabetes, Asthma, CHF, HIV, COPD, Chemo, renal failure, sickle cell, etc?            Asthma    Protocols used: CORONAVIRUS (COVID-19) DIAGNOSED OR SUSPECTED-ADULT-

## 2022-07-03 NOTE — PROGRESS NOTES
[facility]        NAME: Rashmi Contreras is a 61 y o  female  : 1955    MRN: [id number]  DATE: July 3, 2022  TIME: 10:39 AM    Assessment and Plan   COVID-19 [U07 1]  1  COVID-19  DISCONTINUED: nirmatrelvir & ritonavir (Paxlovid) tablet therapy pack     10:39 AM  Patient presents today with a positive COVID test asking about possible Paxlovid prescription  Her  is immunocompromised and she has severe asthma  She started with symptoms 3 days ago on   She had a fever last night and reports a mild cough  On initial review, there were no drug interactions on the [address] form, however when I tried to prescribe it through epic, a severe interaction with advair, causing increased risk of long QT syndrome and possible torsades  I informed the patient of this significant and possibly life threatening interaction and I do not feel comfortable prescribing this medication  I advised that she call her PCP and discuss it with her  Patient Instructions     COVID-19 (Coronavirus Disease 2019)   WHAT YOU NEED TO KNOW:   COVID-19 is the disease caused by a coronavirus first discovered in 2019  Coronaviruses generally cause upper respiratory (nose, throat, and lung) infections, such as a cold  The 2019 virus spreads quickly and easily  It can be spread starting 2 to 3 days before symptoms even begin  DISCHARGE INSTRUCTIONS:   Call your local emergency number (911 in the [address] [place] [address],[address]) if:   · You have trouble breathing or shortness of breath at rest     · You have chest pain or pressure that lasts longer than 5 minutes  · You become confused or hard to wake  · Your lips or face are blue  Return to the emergency department if:   · You have a fever of 104°F (40°C) or higher  Call your doctor if:   · You have symptoms of COVID-19  · You have questions or concerns about your condition or care  Medicines: You may need any of the followin   Decongestants help reduce nasal congestion and help you breathe more easily  If you take decongestant pills, they may make you feel restless or cause problems with your sleep  Do not use decongestant sprays for more than a few days  2  Cough suppressants  help reduce coughing  Ask your healthcare provider which type of cough medicine is best for you  3  Acetaminophen  decreases pain and fever  It is available without a doctor's order  Ask how much to take and how often to take it  Follow directions  Read the labels of all other medicines you are using to see if they also contain acetaminophen, or ask your doctor or pharmacist  Acetaminophen can cause liver damage if not taken correctly  Do not use more than 4 grams (4,000 milligrams) total of acetaminophen in one day  4  NSAIDs , such as ibuprofen, help decrease swelling, pain, and fever  This medicine is available with or without a doctor's order  NSAIDs can cause stomach bleeding or kidney problems in certain people  If you take blood thinner medicine, always ask your healthcare provider if NSAIDs are safe for you  Always read the medicine label and follow directions  5  Blood thinners  help prevent blood clots  Clots can cause strokes, heart attacks, and death  The following are general safety guidelines to follow while you are taking a blood thinner:    1  Watch for bleeding and bruising while you take blood thinners  Watch for bleeding from your gums or nose  Watch for blood in your urine and bowel movements  Use a soft washcloth on your skin, and a soft toothbrush to brush your teeth  This can keep your skin and gums from bleeding  If you shave, use an electric shaver  Do not play contact sports  2  Tell your dentist and other healthcare providers that you take a blood thinner  Wear a bracelet or necklace that says you take this medicine  3  Do not start or stop any other medicines unless your healthcare provider tells you to   Many medicines cannot be used with blood thinners  4  Take your blood thinner exactly as prescribed by your healthcare provider  Do not skip does or take less than prescribed  Tell your provider right away if you forget to take your blood thinner, or if you take too much  5  Warfarin  is a blood thinner that you may need to take  The following are things you should be aware of if you take warfarin:     § Foods and medicines can affect the amount of warfarin in your blood  Do not make major changes to your diet while you take warfarin  Warfarin works best when you eat about the same amount of vitamin K every day  Vitamin K is found in green leafy vegetables and certain other foods  Ask for more information about what to eat when you are taking warfarin  § You will need to see your healthcare provider for follow-up visits when you are on warfarin  You will need regular blood tests  These tests are used to decide how much medicine you need  6  Take your medicine as directed  Contact your healthcare provider if you think your medicine is not helping or if you have side effects  Tell him or her if you are allergic to any medicine  Keep a list of the medicines, vitamins, and herbs you take  Include the amounts, and when and why you take them  Bring the list or the pill bottles to follow-up visits  Carry your medicine list with you in case of an emergency  What you need to know about variants: The virus has changed into several new forms (called variants) since it was discovered  The variants may be more contagious (easily spread) than the original form  Some may also cause more severe illness than others  What you need to know about COVID-19 vaccines:  Healthcare providers recommend a COVID-19 vaccine, even if you have already had COVID-19  You are considered fully vaccinated against COVID-19 two weeks after the final dose of any COVID-19 vaccine   Let your healthcare provider know when you have received the final dose of the vaccine  Make a copy of your vaccination card  Keep the original with you in case you need to show it  Keep the copy in a safe place  1  COVID-19 vaccines are given as a shot in 1 or 2 doses  Vaccination is recommended for everyone 5 years or older  1  One 2-dose vaccine is fully approved  for those 16 years or older  This vaccine also has an emergency use authorization (EUA) for children 11to 13years old  No vaccine is currently available for children younger than 5 years  2  A booster (additional) dose  is given to help the immune system continue to protect against severe COVID-19     1  A booster is recommended for all adults 18 or older  The booster can be a different brand of the COVID-19 vaccine than you originally received  The timing for the booster depends on the type of vaccine you received:    § 1-dose vaccine: The booster is given at least 2 months after you received the vaccine  § 2-dose vaccine: The booster is given at least 5 or 6 months after the second dose  2  A booster can be given to adolescents 15to 16years old  Only 1 COVID-19 vaccine has this EUA  The booster is given at least 5 months after the second dose of the original vaccine series  3  A booster is recommended for immunocompromised children 11to 6years old  Only 1 COVID-19 vaccine has this EUA  The booster is given 28 days after the second dose  Continue social distancing and other measures, even after you get the vaccine  Although it is not common, you can become infected after you get the vaccine  You may also be able to pass the virus to others without knowing you are infected  After you get the vaccine, check local, national, and international travel rules  You may need to be tested before you travel  Some countries require proof of a negative test before you travel  You may also need to quarantine after you return  Medicine may be given to prevent infection    The medicine can be given if you are at high risk for infection and cannot get the vaccine  It can also be given if your immune system does not respond well to the vaccine  How the 2019 coronavirus spreads:   · Droplets are the main way all coronaviruses spread  The virus travels in droplets that form when a person talks, sings, coughs, or sneezes  The droplets can also float in the air for minutes or hours  Infection happens when you breathe in the droplets or get them in your eyes or nose  Close personal contact with an infected person increases your risk for infection  This means being within 6 feet (2 meters) of the person for at least 15 minutes over 24 hours  · Person-to-person contact can spread the virus  For example, a person with the virus on his or her hands can spread it by shaking hands with someone  · The virus can stay on objects and surfaces for up to 3 days  You may become infected by touching the object or surface and then touching your eyes or mouth  Help lower the risk for COVID-19:   · Wash your hands often throughout the day  Use soap and water  Rub your soapy hands together, lacing your fingers, for at least 20 seconds  Rinse with warm, running water  Dry your hands with a clean towel or paper towel  Use hand  that contains alcohol if soap and water are not available  Teach children how to wash their hands and use hand   · Cover sneezes and coughs  Turn your face away and cover your mouth and nose with a tissue  Throw the tissue away  Use the bend of your arm if a tissue is not available  Then wash your hands well with soap and water or use hand   Teach children how to cover a cough or sneeze  · Wear a face covering (mask) when needed  Use a cloth covering with at least 2 layers  You can also create layers by putting a cloth covering over a disposable non-medical mask  Cover your mouth and your nose           · Follow worldwide, national, and local social distancing guidelines  Keep at least 6 feet (2 meters) between you and others  · Try not to touch your face  If you get the virus on your hands, you can transfer it to your eyes, nose, or mouth and become infected  You can also transfer it to objects, surfaces, or people  · Clean and disinfect high-touch surfaces and objects often  Use disinfecting wipes, or make a solution of 4 teaspoons of bleach in 1 quart (4 cups) of water  · Ask about other vaccines you may need  Get the influenza (flu) vaccine as soon as recommended each year, usually starting in September or October  Get the pneumonia vaccine if recommended  Your healthcare provider can tell you if you should also get other vaccines, and when to get them  Follow social distancing guidelines:  National and local social distancing rules vary  Rules and restrictions may change over time as restrictions are lifted  The following are general guidelines:  · Stay home if you are sick or think you may have COVID-19  It is important to stay home if you are waiting for a testing appointment or for test results  · Avoid close physical contact with anyone who does not live in your home  Do not shake hands with, hug, or kiss a person as a greeting  If you must use public transportation (such as a bus or subway), try to sit or stand away from others  Wear your face covering  · Avoid in-person gatherings and crowds  Attend virtually if possible  Follow up with your doctor as directed:  Write down your questions so you remember to ask them during your visits  For more information:   · Centers for Disease Control and Prevention  [address] , [address]  Phone: 5- 243 - 748-7130  Web Address: [employer] com br    © Copyright [employer] 2022 Information is for End User's use only and may not be sold, redistributed or otherwise used for commercial purposes   All illustrations and images included in CareNotes® are the copyrighted property of [employer]  or [facility]  The above information is an  only  It is not intended as medical advice for individual conditions or treatments  Talk to your doctor, nurse or pharmacist before following any medical regimen to see if it is safe and effective for you  Follow up with PCP in 3-5 days  Proceed to  ER if symptoms worsen  Chief Complaint     Chief Complaint   Patient presents with   [id number] [phone] [id number]     Patient tested positive for covid today  She started with symptoms on Friday  She would like to discuss medications         History of Present Illness       Patient presents today with a positive COVID test asking about possible Paxlovid prescription  Her  is immunocompromised and she has severe asthma  She started with symptoms 3 days ago on Friday 7/1  She had a fever last night and reports a mild cough  Review of Systems   Review of Systems   Constitutional: Positive for fever  Negative for chills and fatigue  HENT: Positive for congestion  Negative for postnasal drip, sore throat and trouble swallowing  Eyes: Negative for visual disturbance  Respiratory: Positive for cough  Negative for chest tightness and shortness of breath  Cardiovascular: Negative for leg swelling  Gastrointestinal: Negative for abdominal pain  Genitourinary: Negative for dysuria  Musculoskeletal: Negative for back pain  Skin: Negative for rash  Allergic/Immunologic: Negative for immunocompromised state  Neurological: Negative for dizziness, light-headedness and headaches  Psychiatric/Behavioral: Negative for confusion           Current Medications       Current Outpatient Medications:     Advair Diskus 100-50 MCG/DOSE inhaler, Inhale 1 puff 2 (two) times a day Rinse mouth after use , Disp: 180 blister, Rfl: 1    fenofibrate 160 MG tablet, Take 1 tablet (160 mg total) by mouth daily, Disp: 90 tablet, Rfl: 1    lisinopril-hydrochlorothiazide (PRINZIDE,ZESTORETIC) 10-12 5 MG per tablet, Take 1 tablet by mouth daily, Disp: 90 tablet, Rfl: 1    loratadine (CLARITIN) 10 mg tablet, Take 10 mg by mouth daily, Disp: , Rfl:     LORazepam (Ativan) 0 5 mg tablet, Take 1 tablet (0 5 mg total) by mouth every 8 (eight) hours as needed (Fear of flying), Disp: 10 tablet, Rfl: 0    metFORMIN (GLUCOPHAGE) 500 mg tablet, Take 1 tablet (500 mg total) by mouth daily with breakfast, Disp: 90 tablet, Rfl: 3    Synthroid 200 MCG tablet, Take 1 tablet (200 mcg total) by mouth daily, Disp: 90 tablet, Rfl: 1    albuterol (PROVENTIL HFA,VENTOLIN HFA) 90 mcg/act inhaler, Inhale 2 puffs every 6 (six) hours as needed for wheezing or shortness of breath PROVENTIL PREFERRED (Patient not taking: Reported on 2022 ), Disp: 3 Inhaler, Rfl: 0    Current Allergies     Allergies as of 2022    (No Known Allergies)            The following portions of the patient's history were reviewed and updated as appropriate: allergies, current medications, past family history, past medical history, past social history, past surgical history and problem list      Past Medical History:   Diagnosis Date    Allergic     Anxiety     Anxiety 2021    Arthritis     both feet    Asthma 2021    Diabetes mellitus, type 2 ([address] ) 2021    Disease of thyroid gland     GERD (gastroesophageal reflux disease)     HTN (hypertension) 2021    Hyperlipidemia     Hyperlipidemia 2021    Hypertension     Hypothyroidism 2021       Past Surgical History:   Procedure Laterality Date     SECTION  08/10/1991    COLONOSCOPY         Family History   Problem Relation Age of Onset    Hypertension Mother     COPD Mother     Hypertension Father     Autism spectrum disorder Brother     Hearing loss Brother     Diabetes Maternal Grandfather     Stomach cancer Paternal Grandmother          Medications have been verified          Objective   /81   Pulse 74   Temp 97 9 °F (36 6 °C)   Ht 5' 6" (1 676 m)   Wt 91 6 kg (202 lb)   SpO2 97%   BMI 32 60 kg/m²        Physical Exam     Physical Exam  Vitals and nursing note reviewed  Constitutional:       Appearance: Normal appearance  HENT:      Head: Normocephalic and atraumatic  Nose: Rhinorrhea present  Cardiovascular:      Rate and Rhythm: Normal rate and regular rhythm  Pulmonary:      Effort: Pulmonary effort is normal       Breath sounds: Normal breath sounds  No wheezing  Neurological:      Mental Status: She is alert

## 2022-07-03 NOTE — PATIENT INSTRUCTIONS
COVID-19 (Coronavirus Disease 2019)   WHAT YOU NEED TO KNOW:   COVID-19 is the disease caused by a coronavirus first discovered in December 2019  Coronaviruses generally cause upper respiratory (nose, throat, and lung) infections, such as a cold  The 2019 virus spreads quickly and easily  It can be spread starting 2 to 3 days before symptoms even begin  DISCHARGE INSTRUCTIONS:   Call your local emergency number (911 in the [address] [facility] [facility]) if:   You have trouble breathing or shortness of breath at rest     You have chest pain or pressure that lasts longer than 5 minutes  You become confused or hard to wake  Your lips or face are blue  Return to the emergency department if:   You have a fever of 104°F (40°C) or higher  Call your doctor if:   You have symptoms of COVID-19  You have questions or concerns about your condition or care  Medicines: You may need any of the following:  Decongestants  help reduce nasal congestion and help you breathe more easily  If you take decongestant pills, they may make you feel restless or cause problems with your sleep  Do not use decongestant sprays for more than a few days  Cough suppressants  help reduce coughing  Ask your healthcare provider which type of cough medicine is best for you  Acetaminophen  decreases pain and fever  It is available without a doctor's order  Ask how much to take and how often to take it  Follow directions  Read the labels of all other medicines you are using to see if they also contain acetaminophen, or ask your doctor or pharmacist  Acetaminophen can cause liver damage if not taken correctly  Do not use more than 4 grams (4,000 milligrams) total of acetaminophen in one day  NSAIDs , such as ibuprofen, help decrease swelling, pain, and fever  This medicine is available with or without a doctor's order  NSAIDs can cause stomach bleeding or kidney problems in certain people   If you take blood thinner medicine, always ask your healthcare provider if NSAIDs are safe for you  Always read the medicine label and follow directions  Blood thinners  help prevent blood clots  Clots can cause strokes, heart attacks, and death  The following are general safety guidelines to follow while you are taking a blood thinner:    Watch for bleeding and bruising while you take blood thinners  Watch for bleeding from your gums or nose  Watch for blood in your urine and bowel movements  Use a soft washcloth on your skin, and a soft toothbrush to brush your teeth  This can keep your skin and gums from bleeding  If you shave, use an electric shaver  Do not play contact sports  Tell your dentist and other healthcare providers that you take a blood thinner  Wear a bracelet or necklace that says you take this medicine  Do not start or stop any other medicines unless your healthcare provider tells you to  Many medicines cannot be used with blood thinners  Take your blood thinner exactly as prescribed by your healthcare provider  Do not skip does or take less than prescribed  Tell your provider right away if you forget to take your blood thinner, or if you take too much  Warfarin  is a blood thinner that you may need to take  The following are things you should be aware of if you take warfarin:     Foods and medicines can affect the amount of warfarin in your blood  Do not make major changes to your diet while you take warfarin  Warfarin works best when you eat about the same amount of vitamin K every day  Vitamin K is found in green leafy vegetables and certain other foods  Ask for more information about what to eat when you are taking warfarin  You will need to see your healthcare provider for follow-up visits when you are on warfarin  You will need regular blood tests  These tests are used to decide how much medicine you need  Take your medicine as directed    Contact your healthcare provider if you think your medicine is not helping or if you have side effects  Tell him or her if you are allergic to any medicine  Keep a list of the medicines, vitamins, and herbs you take  Include the amounts, and when and why you take them  Bring the list or the pill bottles to follow-up visits  Carry your medicine list with you in case of an emergency  What you need to know about variants: The virus has changed into several new forms (called variants) since it was discovered  The variants may be more contagious (easily spread) than the original form  Some may also cause more severe illness than others  What you need to know about COVID-19 vaccines:  Healthcare providers recommend a COVID-19 vaccine, even if you have already had COVID-19  You are considered fully vaccinated against COVID-19 two weeks after the final dose of any COVID-19 vaccine  Let your healthcare provider know when you have received the final dose of the vaccine  Make a copy of your vaccination card  Keep the original with you in case you need to show it  Keep the copy in a safe place  COVID-19 vaccines are given as a shot in 1 or 2 doses  Vaccination is recommended for everyone 5 years or older  One 2-dose vaccine is fully approved  for those 16 years or older  This vaccine also has an emergency use authorization (EUA) for children 11to 13years old  No vaccine is currently available for children younger than 5 years  A booster (additional) dose  is given to help the immune system continue to protect against severe COVID-19  A booster is recommended for all adults 18 or older  The booster can be a different brand of the COVID-19 vaccine than you originally received  The timing for the booster depends on the type of vaccine you received:    1-dose vaccine: The booster is given at least 2 months after you received the vaccine  2-dose vaccine: The booster is given at least 5 or 6 months after the second dose  A booster can be given to adolescents 15to 16years old    Only 1 COVID-19 vaccine has this EUA  The booster is given at least 5 months after the second dose of the original vaccine series  A booster is recommended for immunocompromised children 11to 6years old  Only 1 COVID-19 vaccine has this EUA  The booster is given 28 days after the second dose  Continue social distancing and other measures, even after you get the vaccine  Although it is not common, you can become infected after you get the vaccine  You may also be able to pass the virus to others without knowing you are infected  After you get the vaccine, check local, national, and international travel rules  You may need to be tested before you travel  Some countries require proof of a negative test before you travel  You may also need to quarantine after you return  Medicine may be given to prevent infection  The medicine can be given if you are at high risk for infection and cannot get the vaccine  It can also be given if your immune system does not respond well to the vaccine  How the 2019 coronavirus spreads:   Droplets are the main way all coronaviruses spread  The virus travels in droplets that form when a person talks, sings, coughs, or sneezes  The droplets can also float in the air for minutes or hours  Infection happens when you breathe in the droplets or get them in your eyes or nose  Close personal contact with an infected person increases your risk for infection  This means being within 6 feet (2 meters) of the person for at least 15 minutes over 24 hours  Person-to-person contact can spread the virus  For example, a person with the virus on his or her hands can spread it by shaking hands with someone  The virus can stay on objects and surfaces for up to 3 days  You may become infected by touching the object or surface and then touching your eyes or mouth  Help lower the risk for COVID-19:   Wash your hands often throughout the day  Use soap and water   Rub your soapy hands together, lacing your fingers, for at least 20 seconds  Rinse with warm, running water  Dry your hands with a clean towel or paper towel  Use hand  that contains alcohol if soap and water are not available  Teach children how to wash their hands and use hand   Cover sneezes and coughs  Turn your face away and cover your mouth and nose with a tissue  Throw the tissue away  Use the bend of your arm if a tissue is not available  Then wash your hands well with soap and water or use hand   Teach children how to cover a cough or sneeze  Wear a face covering (mask) when needed  Use a cloth covering with at least 2 layers  You can also create layers by putting a cloth covering over a disposable non-medical mask  Cover your mouth and your nose  Follow worldwide, national, and local social distancing guidelines  Keep at least 6 feet (2 meters) between you and others  Try not to touch your face  If you get the virus on your hands, you can transfer it to your eyes, nose, or mouth and become infected  You can also transfer it to objects, surfaces, or people  Clean and disinfect high-touch surfaces and objects often  Use disinfecting wipes, or make a solution of 4 teaspoons of bleach in 1 quart (4 cups) of water  Ask about other vaccines you may need  Get the influenza (flu) vaccine as soon as recommended each year, usually starting in September or October  Get the pneumonia vaccine if recommended  Your healthcare provider can tell you if you should also get other vaccines, and when to get them  Follow social distancing guidelines:  National and local social distancing rules vary  Rules and restrictions may change over time as restrictions are lifted  The following are general guidelines:  Stay home if you are sick or think you may have COVID-19  It is important to stay home if you are waiting for a testing appointment or for test results      Avoid close physical contact with anyone who does not live in your home  Do not shake hands with, hug, or kiss a person as a greeting  If you must use public transportation (such as a bus or subway), try to sit or stand away from others  Wear your face covering  Avoid in-person gatherings and crowds  Attend virtually if possible  Follow up with your doctor as directed:  Write down your questions so you remember to ask them during your visits  For more information:   [facility]  [address] [place] , [address]  Phone: [phone]  Web Address: [employer] com br    © Copyright [employer] 2022 Information is for End User's use only and may not be sold, redistributed or otherwise used for commercial purposes  All illustrations and images included in CareNotes® are the copyrighted property of [employer]  or Eli Chand   The above information is an  only  It is not intended as medical advice for individual conditions or treatments  Talk to your doctor, nurse or pharmacist before following any medical regimen to see if it is safe and effective for you

## 2022-07-03 NOTE — TELEPHONE ENCOUNTER
Symptomatic  Covid + on 7/2 7/1: Symptomatic: Sore throat, Nasal congestion, mild wheezing  Denies SOB/breathing difficulties  Denies Chest pain  Requesting SWAB order, care/treatment advise and f/u virtual appointment  Directed to PCP for COVID orders  Declined; preferred [facility] location due to urgency need  Care advice given  Informed to call back if worsening symptoms  Verbalized understanding and agreeable with disposition  No further questions

## 2022-07-03 NOTE — TELEPHONE ENCOUNTER
Regarding: [facility]-Covid positive  ----- Message from Dominic Hudson sent at 7/2/2022  7:58 PM EDT -----  Pt called, " I just tested positive for covid from a rapid test  I have bad asthma   Is there a way that I can get paxlovid/"

## 2022-07-05 ENCOUNTER — TELEMEDICINE (OUTPATIENT)
Dept: FAMILY MEDICINE CLINIC | Facility: CLINIC | Age: 67
End: 2022-07-05
Payer: COMMERCIAL

## 2022-07-05 DIAGNOSIS — U07.1 COVID-19: Primary | ICD-10-CM

## 2022-07-05 PROCEDURE — 99213 OFFICE O/P EST LOW 20 MIN: CPT | Performed by: FAMILY MEDICINE

## 2022-07-05 NOTE — PROGRESS NOTES
COVID-19 Outpatient Progress Note    Assessment/Plan:    Problem List Items Addressed This Visit    None        Disposition:     Discussed symptom directed medication options with patient  Discussed vitamin D, vitamin C, and/or zinc supplementation with patient  Patient to quarantine through July 6 Then she will mask for 5 more days Patient to call if change in symptoms     I have spent 12 minutes directly with the patient  Greater than 50% of this time was spent in counseling/coordination of care regarding: diagnostic results, prognosis, risks and benefits of treatment options, instructions for management, patient and family education, risk factor reductions and impressions  Paxlovid is contraindicated with the advair Patient is improving       Encounter provider Suzy Jackson DO    Provider located at [address] [place] [address] PRIMARY CARE  [address] [facility] [address]  [facility] [address] [phone]    Recent Visits  No visits were found meeting these conditions  Showing recent visits within past 7 days and meeting all other requirements  Future Appointments  No visits were found meeting these conditions  Showing future appointments within next 150 days and meeting all other requirements     This virtual check-in was done via [employer] and patient was informed that this is a secure, HIPAA-compliant platform  She agrees to proceed  Patient agrees to participate in a virtual check in via telephone or video visit instead of presenting to the office to address urgent/immediate medical needs  Patient is aware this is a billable service  After connecting through [facility], the patient was identified by name and date of birth  Denver Bali was informed that this was a telemedicine visit and that the exam was being conducted confidentially over secure lines  My office door was closed   Denver Bali acknowledged consent and understanding of privacy and security of the telemedicine visit  I informed the patient that I have reviewed her record in Epic and presented the opportunity for her to ask any questions regarding the visit today  The patient agreed to participate  Verification of patient location:  Patient is located in the following state in which I hold an active license: [place]    Subjective: Olivia Mena is a 61 y o  female who has been screened for COVID-19  Symptom change since last report: improving  Patient's symptoms include fever, chills, fatigue, malaise, nasal congestion, sore throat, cough and headache  Patient denies rhinorrhea, anosmia, loss of taste, shortness of breath, chest tightness, abdominal pain, nausea, vomiting, diarrhea and myalgias  - Date of symptom onset: 7/1/2022  - Date of positive COVID-19 test: 7/2/2022  Type of test: Home antigen  Home antigen result verified by provider  Will get picture of test uploaded/scanned into patient's chart  COVID-19 vaccination status: Fully vaccinated with booster    Hayley Kaplan has been staying home and has isolated themselves in her home  She is taking care to not share personal items and is cleaning all surfaces that are touched often, like counters, tabletops, and doorknobs using household cleaning sprays or wipes  She is wearing a mask when she leaves her room       Patient was already at urgent care Patient got sick late Thursday night with sore throat and congestion She was en route home from [place] Patient highest temp was 100 She has not needed her albuterol at all since she got sick     Lab Results   Component Value Date    SARSCOV2 Negative 05/06/2022     Past Medical History:   Diagnosis Date    Allergic     Anxiety     Anxiety 12/17/2021    Arthritis     both feet    Asthma 12/17/2021    Diabetes mellitus, type 2 ([address] ) 12/17/2021    Disease of thyroid gland     GERD (gastroesophageal reflux disease)     HTN (hypertension) 12/17/2021    Hyperlipidemia     Hyperlipidemia 12/17/2021    Hypertension     Hypothyroidism 2021     Past Surgical History:   Procedure Laterality Date     SECTION  08/10/1991    COLONOSCOPY       Current Outpatient Medications   Medication Sig Dispense Refill    Advair Diskus 100-50 MCG/DOSE inhaler Inhale 1 puff 2 (two) times a day Rinse mouth after use  180 blister 1    albuterol (PROVENTIL HFA,VENTOLIN HFA) 90 mcg/act inhaler Inhale 2 puffs every 6 (six) hours as needed for wheezing or shortness of breath PROVENTIL PREFERRED (Patient not taking: Reported on 2022 ) 3 Inhaler 0    fenofibrate 160 MG tablet Take 1 tablet (160 mg total) by mouth daily 90 tablet 1    lisinopril-hydrochlorothiazide (PRINZIDE,ZESTORETIC) 10-12 5 MG per tablet Take 1 tablet by mouth daily 90 tablet 1    loratadine (CLARITIN) 10 mg tablet Take 10 mg by mouth daily      LORazepam (Ativan) 0 5 mg tablet Take 1 tablet (0 5 mg total) by mouth every 8 (eight) hours as needed (Fear of flying) 10 tablet 0    metFORMIN (GLUCOPHAGE) 500 mg tablet Take 1 tablet (500 mg total) by mouth daily with breakfast 90 tablet 3    Synthroid 200 MCG tablet Take 1 tablet (200 mcg total) by mouth daily 90 tablet 1     No current facility-administered medications for this visit  No Known Allergies    Review of Systems   Constitutional: Positive for chills, fatigue and fever  HENT: Positive for congestion and sore throat  Negative for rhinorrhea  Respiratory: Positive for cough  Negative for chest tightness and shortness of breath  Gastrointestinal: Negative for abdominal pain, diarrhea, nausea and vomiting  Musculoskeletal: Negative for myalgias  Neurological: Positive for headaches  Objective: There were no vitals filed for this visit  Physical Exam  Constitutional:       Appearance: Normal appearance  HENT:      Head: Normocephalic        Right Ear: External ear normal       Left Ear: External ear normal    Eyes:      Extraocular Movements: Extraocular movements intact  Conjunctiva/sclera: Conjunctivae normal       Pupils: Pupils are equal, round, and reactive to light  Pulmonary:      Effort: Pulmonary effort is normal    Neurological:      General: No focal deficit present  Mental Status: She is alert and oriented to person, place, and time  Psychiatric:         Mood and Affect: Mood normal          Behavior: Behavior normal          Thought Content: Thought content normal          Judgment: Judgment normal          VIRTUAL VISIT DISCLAIMER    Hannah Goodson verbally agrees to participate in [employer]  Pt is aware that [employer] could be limited without vital signs or the ability to perform a full hands-on physical Thelma Torrez understands she or the provider may request at any time to terminate the video visit and request the patient to seek care or treatment in person

## 2022-08-01 DIAGNOSIS — E11.9 TYPE 2 DIABETES MELLITUS WITHOUT COMPLICATION, WITHOUT LONG-TERM CURRENT USE OF INSULIN (HCC): ICD-10-CM

## 2022-08-01 DIAGNOSIS — J45.20 MILD INTERMITTENT ASTHMA, UNSPECIFIED WHETHER COMPLICATED: ICD-10-CM

## 2022-08-01 DIAGNOSIS — I10 PRIMARY HYPERTENSION: ICD-10-CM

## 2022-08-01 DIAGNOSIS — E78.2 MODERATE MIXED HYPERLIPIDEMIA NOT REQUIRING STATIN THERAPY: ICD-10-CM

## 2022-08-01 DIAGNOSIS — E03.9 ACQUIRED HYPOTHYROIDISM: ICD-10-CM

## 2022-08-01 RX ORDER — ALBUTEROL SULFATE 90 UG/1
2 AEROSOL, METERED RESPIRATORY (INHALATION) EVERY 6 HOURS PRN
Qty: 54 G | Refills: 0 | Status: SHIPPED | OUTPATIENT
Start: 2022-08-01

## 2022-08-01 RX ORDER — LISINOPRIL AND HYDROCHLOROTHIAZIDE 12.5; 1 MG/1; MG/1
1 TABLET ORAL DAILY
Qty: 90 TABLET | Refills: 1 | Status: SHIPPED | OUTPATIENT
Start: 2022-08-01

## 2022-08-01 RX ORDER — FENOFIBRATE 160 MG/1
160 TABLET ORAL DAILY
Qty: 90 TABLET | Refills: 1 | Status: SHIPPED | OUTPATIENT
Start: 2022-08-01

## 2022-08-01 RX ORDER — LEVOTHYROXINE SODIUM 200 MCG
200 TABLET ORAL DAILY
Qty: 90 TABLET | Refills: 1 | Status: SHIPPED | OUTPATIENT
Start: 2022-08-01

## 2022-11-02 ENCOUNTER — OFFICE VISIT (OUTPATIENT)
Dept: FAMILY MEDICINE CLINIC | Facility: CLINIC | Age: 67
End: 2022-11-02

## 2022-11-02 VITALS
HEIGHT: 66 IN | HEART RATE: 82 BPM | WEIGHT: 203.6 LBS | TEMPERATURE: 96.6 F | OXYGEN SATURATION: 97 % | DIASTOLIC BLOOD PRESSURE: 72 MMHG | RESPIRATION RATE: 16 BRPM | BODY MASS INDEX: 32.72 KG/M2 | SYSTOLIC BLOOD PRESSURE: 112 MMHG

## 2022-11-02 DIAGNOSIS — I10 PRIMARY HYPERTENSION: ICD-10-CM

## 2022-11-02 DIAGNOSIS — Z12.31 ENCOUNTER FOR SCREENING MAMMOGRAM FOR BREAST CANCER: ICD-10-CM

## 2022-11-02 DIAGNOSIS — Z13.820 ENCOUNTER FOR OSTEOPOROSIS SCREENING IN ASYMPTOMATIC POSTMENOPAUSAL PATIENT: ICD-10-CM

## 2022-11-02 DIAGNOSIS — E03.9 ACQUIRED HYPOTHYROIDISM: ICD-10-CM

## 2022-11-02 DIAGNOSIS — E11.9 TYPE 2 DIABETES MELLITUS WITHOUT COMPLICATION, WITHOUT LONG-TERM CURRENT USE OF INSULIN (HCC): Primary | ICD-10-CM

## 2022-11-02 DIAGNOSIS — Z78.0 ENCOUNTER FOR OSTEOPOROSIS SCREENING IN ASYMPTOMATIC POSTMENOPAUSAL PATIENT: ICD-10-CM

## 2022-11-02 DIAGNOSIS — Z23 ENCOUNTER FOR IMMUNIZATION: ICD-10-CM

## 2022-11-02 DIAGNOSIS — E78.2 MODERATE MIXED HYPERLIPIDEMIA NOT REQUIRING STATIN THERAPY: ICD-10-CM

## 2022-11-02 LAB — SL AMB POCT HEMOGLOBIN AIC: 8.2 (ref ?–6.5)

## 2022-11-02 RX ORDER — PIOGLITAZONE HCL AND METFORMIN HCL 500; 15 MG/1; MG/1
1 TABLET ORAL DAILY
Qty: 90 TABLET | Refills: 1 | Status: SHIPPED | OUTPATIENT
Start: 2022-11-02

## 2022-11-02 RX ORDER — LISINOPRIL AND HYDROCHLOROTHIAZIDE 12.5; 1 MG/1; MG/1
1 TABLET ORAL DAILY
Qty: 90 TABLET | Refills: 1 | Status: SHIPPED | OUTPATIENT
Start: 2022-11-02

## 2022-11-02 RX ORDER — LEVOTHYROXINE SODIUM 200 MCG
200 TABLET ORAL DAILY
Qty: 90 TABLET | Refills: 1 | Status: SHIPPED | OUTPATIENT
Start: 2022-11-02

## 2022-11-02 RX ORDER — FENOFIBRATE 160 MG/1
160 TABLET ORAL DAILY
Qty: 90 TABLET | Refills: 1 | Status: SHIPPED | OUTPATIENT
Start: 2022-11-02

## 2022-11-02 NOTE — PROGRESS NOTES
Name: Jovany Scherer      : 1955      MRN: [id number]  Encounter Provider: Shanti Urban DO  Encounter Date: 2022   Encounter department: [facility] PRIMARY CARE    Assessment & Plan     1  Type 2 diabetes mellitus without complication, without long-term current use of insulin (HCC)  -     POCT hemoglobin A1c  -     pioglitazone-metFORMIN (ACTOPLUS MET)  MG per tablet; Take 1 tablet by mouth in the morning  -     HEMOGLOBIN A1C W/ EAG ESTIMATION; Future; Expected date: 2023    2  Encounter for immunization    3  Encounter for screening mammogram for breast cancer  -     Mammo screening bilateral w 3d & cad; Future; Expected date: 2022    4  Encounter for osteoporosis screening in asymptomatic postmenopausal patient  -     DXA bone density spine hip and pelvis; Future; Expected date: 2022    5  Primary hypertension  -     lisinopril-hydrochlorothiazide (PRINZIDE,ZESTORETIC) 10-12 5 MG per tablet; Take 1 tablet by mouth daily    6  Moderate mixed hyperlipidemia not requiring statin therapy  -     fenofibrate 160 MG tablet; Take 1 tablet (160 mg total) by mouth daily  -     Lipid Panel with Direct LDL reflex; Future; Expected date: 2023    7  Acquired hypothyroidism  -     Synthroid 200 MCG tablet; Take 1 tablet (200 mcg total) by mouth daily        The current medical regimen is effective;  continue present plan and medications  The patient is asked to continue to improve diet and exercise patterns to aid in medical management of this problem  Subjective     Chief Complaint   Patient presents with   • Follow-up     6 month follow up      Patient is a 82-year-old female here for six-month checkup       Wonderful trip to [facility] positive----after trip  Doing work in her field, her week, 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 (cutting back)   isdoing better with his cancer  1500 calorie intake  Does VERY limited carbs  Walking and Pickle ball   Sees Dr Nathalie Campo- will make appt  Component      Latest Ref Rng & Units 2022   Hemoglobin A1C      6 5 7 5 8 2 (A)     Review of Systems    Past Medical History:   Diagnosis Date   • Allergic    • Anxiety    • Anxiety 2021   • Arthritis     both feet   • Asthma 2021   • Diabetes mellitus, type 2 ([address] ) 2021   • Disease of thyroid gland    • GERD (gastroesophageal reflux disease)    • HTN (hypertension) 2021   • Hyperlipidemia    • Hyperlipidemia 2021   • Hypertension    • Hypothyroidism 2021     Past Surgical History:   Procedure Laterality Date   •  SECTION  08/10/1991   • COLONOSCOPY       Family History   Problem Relation Age of Onset   • Hypertension Mother    • COPD Mother    • Hypertension Father    • Autism spectrum disorder Brother    • Hearing loss Brother    • Diabetes Maternal Grandfather    • Stomach cancer Paternal Grandmother      Social History     Socioeconomic History   • Marital status: Unknown     Spouse name: None   • Number of children: None   • Years of education: None   • Highest education level: None   Occupational History   • None   Tobacco Use   • Smoking status: Never Smoker   • Smokeless tobacco: Never Used   Vaping Use   • Vaping Use: Never used   Substance and Sexual Activity   • Alcohol use:  Yes     Alcohol/week: 1 0 standard drink     Types: 1 Shots of liquor per week   • Drug use: Never   • Sexual activity: Yes     Partners: Male   Other Topics Concern   • None   Social History Narrative   • None     Social Determinants of Health     Financial Resource Strain: Not on file   Food Insecurity: Not on file   Transportation Needs: Not on file   Physical Activity: Not on file   Stress: Not on file   Social Connections: Not on file   Intimate Partner Violence: Not on file   Housing Stability: Not on file     Current Outpatient Medications on File Prior to Visit   Medication Sig   • Advair Diskus 100-50 MCG/DOSE inhaler Inhale 1 puff 2 (two) times a day Rinse mouth after use  • albuterol (PROVENTIL HFA,VENTOLIN HFA) 90 mcg/act inhaler Inhale 2 puffs every 6 (six) hours as needed for wheezing or shortness of breath PROVENTIL PREFERRED   • loratadine (CLARITIN) 10 mg tablet Take 10 mg by mouth daily   • LORazepam (Ativan) 0 5 mg tablet Take 1 tablet (0 5 mg total) by mouth every 8 (eight) hours as needed (Fear of flying) (Patient not taking: Reported on 11/2/2022)     No Known Allergies  Immunization History   Administered Date(s) Administered   • COVID-19 MODERNA VACC 0 25 ML IM BOOSTER 10/23/2021, 05/03/2022, 10/17/2022   • COVID-19 MODERNA VACC 0 5 ML IM 01/11/2021, 02/08/2021, 10/17/2022   • INFLUENZA 11/02/2021, 10/17/2022   • Influenza Injectable, MDCK, Preservative Free, Quadrivalent, 0 5 mL 09/14/2020   • Influenza, seasonal, injectable 11/14/2018, 12/09/2019       Objective     /72   Pulse 82   Temp (!) 96 6 °F (35 9 °C) (Temporal)   Resp 16   Ht 5' 6" (1 676 m)   Wt 92 4 kg (203 lb 9 6 oz)   SpO2 97%   BMI 32 86 kg/m²     Physical Exam  Constitutional:       General: She is not in acute distress  Appearance: Normal appearance  She is well-developed  HENT:      Head: Normocephalic  Right Ear: Tympanic membrane normal       Left Ear: Tympanic membrane normal       Nose: Nose normal       Mouth/Throat:      Mouth: Mucous membranes are moist    Eyes:      Pupils: Pupils are equal, round, and reactive to light  Neck:      Vascular: No carotid bruit  Cardiovascular:      Rate and Rhythm: Normal rate and regular rhythm  Heart sounds: No murmur heard  Pulmonary:      Effort: Pulmonary effort is normal       Breath sounds: Normal breath sounds  Abdominal:      General: Bowel sounds are normal       Palpations: Abdomen is soft  There is no mass  Tenderness: There is no abdominal tenderness  Musculoskeletal:         General: Normal range of motion  Skin:     General: Skin is warm and dry     Neurological:      Mental Status: She is alert and oriented to person, place, and time  Deep Tendon Reflexes: Reflexes are normal and symmetric  Psychiatric:         Behavior: Behavior normal          Thought Content:  Thought content normal        Angelo Monday, DO

## 2022-11-16 ENCOUNTER — TELEPHONE (OUTPATIENT)
Dept: FAMILY MEDICINE CLINIC | Facility: CLINIC | Age: 67
End: 2022-11-16

## 2022-11-16 DIAGNOSIS — E03.9 ACQUIRED HYPOTHYROIDISM: ICD-10-CM

## 2022-11-16 RX ORDER — LEVOTHYROXINE SODIUM 0.2 MG/1
200 TABLET ORAL DAILY
Qty: 90 TABLET | Refills: 1 | Status: SHIPPED | OUTPATIENT
Start: 2022-11-16 | End: 2023-01-30 | Stop reason: SDUPTHER

## 2022-11-16 NOTE — TELEPHONE ENCOUNTER
[facility]ern[facility] from [facility] called regarding the patient's Synthroid  They use the name brand Synthroid as their generic brand  So since the script was written as name brand they cant not fill it unless you re prescribed it that generic is okay      Reference # [id number]

## 2023-01-30 DIAGNOSIS — E03.9 ACQUIRED HYPOTHYROIDISM: ICD-10-CM

## 2023-01-30 DIAGNOSIS — E11.9 TYPE 2 DIABETES MELLITUS WITHOUT COMPLICATION, WITHOUT LONG-TERM CURRENT USE OF INSULIN (HCC): ICD-10-CM

## 2023-01-30 LAB
LEFT EYE DIABETIC RETINOPATHY: NORMAL
RIGHT EYE DIABETIC RETINOPATHY: NORMAL
SEVERITY (EYE EXAM): NORMAL

## 2023-01-30 RX ORDER — PIOGLITAZONE HCL AND METFORMIN HCL 500; 15 MG/1; MG/1
1 TABLET ORAL DAILY
Qty: 90 TABLET | Refills: 0 | Status: SHIPPED | OUTPATIENT
Start: 2023-01-30

## 2023-01-30 RX ORDER — LEVOTHYROXINE SODIUM 0.2 MG/1
200 TABLET ORAL DAILY
Qty: 90 TABLET | Refills: 1 | Status: SHIPPED | OUTPATIENT
Start: 2023-01-30

## 2023-02-06 ENCOUNTER — RA CDI HCC (OUTPATIENT)
Dept: OTHER | Facility: HOSPITAL | Age: 68
End: 2023-02-06

## 2023-02-06 NOTE — PROGRESS NOTES
[address]  [facility]  coding opportunities          Chart Reviewed number of suggestions sent to Provider: 1     Patients Insurance     Medicare Insurance: Yvette Candelario

## 2023-02-11 LAB — HBA1C MFR BLD HPLC: 7.7 %

## 2023-02-13 ENCOUNTER — OFFICE VISIT (OUTPATIENT)
Dept: FAMILY MEDICINE CLINIC | Facility: CLINIC | Age: 68
End: 2023-02-13

## 2023-02-13 VITALS
BODY MASS INDEX: 32.95 KG/M2 | DIASTOLIC BLOOD PRESSURE: 70 MMHG | HEART RATE: 72 BPM | HEIGHT: 66 IN | SYSTOLIC BLOOD PRESSURE: 122 MMHG | WEIGHT: 205 LBS | TEMPERATURE: 98 F

## 2023-02-13 DIAGNOSIS — Z23 ENCOUNTER FOR IMMUNIZATION: ICD-10-CM

## 2023-02-13 DIAGNOSIS — E11.9 TYPE 2 DIABETES MELLITUS WITHOUT COMPLICATION, WITHOUT LONG-TERM CURRENT USE OF INSULIN (HCC): Primary | ICD-10-CM

## 2023-02-13 DIAGNOSIS — Z00.00 ENCOUNTER FOR MEDICARE ANNUAL WELLNESS EXAM: ICD-10-CM

## 2023-02-13 LAB — SL AMB POCT HEMOGLOBIN AIC: 7.5 (ref ?–6.5)

## 2023-02-13 NOTE — PROGRESS NOTES
Assessment and Plan:   Stephen Bruce was seen today for medicare wellness visit  Diagnoses and all orders for this visit:    Encounter for Medicare annual wellness exam    Type 2 diabetes mellitus without complication, without long-term current use of insulin ([facility] [address] )  -     POCT hemoglobin A1c    Encounter for immunization  -     Pneumococcal Conjugate Vaccine 20-valent (PCV20)      Problem List Items Addressed This Visit        Endocrine    Diabetes mellitus, type 2 ([facility] [address] )    Relevant Orders    POCT hemoglobin A1c (Completed)   Other Visit Diagnoses     Encounter for Medicare annual wellness exam    -  Primary    Encounter for immunization        Relevant Orders    Pneumococcal Conjugate Vaccine 20-valent (PCV20) (Completed)          The current medical regimen is effective;  continue present plan and medications  The patient is asked to continue to improve diet and exercise patterns to aid in medical management of this problem  Preventive health issues were discussed with patient, and age appropriate screening tests were ordered as noted in patient's After Visit Summary  Personalized health advice and appropriate referrals for health education or preventive services given if needed, as noted in patient's After Visit Summary  History of Present Illness:     Patient presents for a Medicare Wellness Visit    Patient is a 53-year-old female here for Medicare wellness  And review of labs  Just had eye exam by Dr Rossy Camejo  Says she has baby cataracts, no retinopathy  Hemoglobin A1c slightly improved  Patient is working on both dietary and fitness and is frustrated that she cannot lose weight  She does feel like she is getting muscle  She is happy with her compliance and her dedication to working out  Patient Care Team:  Za Higuera,  as PCP - General (Family Medicine)     Review of Systems:   Works out 5 days plus and 1500 calories daily, can't lose---- frustrated  Pickle ball    Going to [facility] weeks can work remotely  Plantar fascitis is gone  Patient will be going to get her second shingles shot when she is in [place]  HGAC1 = 7 5%  LIPID PNL W/LDL RFX  Specimen:  Serum specimen (specimen)   Ref Range & Units 2 d ago Comments   Cholesterol <200 mg/dL 192     Triglyceride <150 mg/dL 94     Cholesterol, HDL, Direct >40 mg/dL 68     Cholesterol, Non-HDL <160 mg/dL 124  Note: For NCEP interpretive guidelines please refer to the Laboratory Handbook  CHOL/HDL Ratio  2 82  Relative Risk   1/2 Average Risk          3 27   Average Risk              4 44   2X Average Risk           7 05   3X Average Risk          11 04   Cholesterol, LDL, Calculated <130 mg/dL 105         Review of Systems   Constitutional: Positive for unexpected weight change (Frustrated with inability to lose)  All other systems reviewed and are negative         Problem List:     Patient Active Problem List   Diagnosis   • Asthma   • Diabetes mellitus, type 2 ([facility] [address] )   • Hyperlipidemia   • HTN (hypertension)   • Hypothyroidism   • Anxiety   • Plantar fasciitis, right      Past Medical and Surgical History:     Past Medical History:   Diagnosis Date   • Allergic    • Anxiety    • Anxiety 2021   • Arthritis     both feet   • Asthma 2021   • Diabetes mellitus, type 2 ([facility] [address] ) 2021   • Disease of thyroid gland    • GERD (gastroesophageal reflux disease)    • HTN (hypertension) 2021   • Hyperlipidemia    • Hyperlipidemia 2021   • Hypertension    • Hypothyroidism 2021     Past Surgical History:   Procedure Laterality Date   •  SECTION  08/10/1991   • COLONOSCOPY        Family History:     Family History   Problem Relation Age of Onset   • Hypertension Mother    • COPD Mother    • Hypertension Father    • Autism spectrum disorder Brother    • Hearing loss Brother    • Diabetes Maternal Grandfather    • Stomach cancer Paternal Grandmother       Social History:     Social History     Socioeconomic History • Marital status: Unknown     Spouse name: None   • Number of children: None   • Years of education: None   • Highest education level: None   Occupational History   • None   Tobacco Use   • Smoking status: Never   • Smokeless tobacco: Never   Vaping Use   • Vaping Use: Never used   Substance and Sexual Activity   • Alcohol use: Yes     Alcohol/week: 1 0 standard drink     Types: 1 Shots of liquor per week   • Drug use: Never   • Sexual activity: Yes     Partners: Male   Other Topics Concern   • None   Social History Narrative   • None     Social Determinants of Health     Financial Resource Strain: Low Risk    • Difficulty of Paying Living Expenses: Not hard at all   Food Insecurity: Not on file   Transportation Needs: No Transportation Needs   • Lack of Transportation (Medical): No   • Lack of Transportation (Non-Medical): No   Physical Activity: Not on file   Stress: Not on file   Social Connections: Not on file   Intimate Partner Violence: Not on file   Housing Stability: Not on file      Medications and Allergies:     Current Outpatient Medications   Medication Sig Dispense Refill   • Advair Diskus 100-50 MCG/DOSE inhaler Inhale 1 puff 2 (two) times a day Rinse mouth after use  180 blister 1   • albuterol (PROVENTIL HFA,VENTOLIN HFA) 90 mcg/act inhaler Inhale 2 puffs every 6 (six) hours as needed for wheezing or shortness of breath PROVENTIL PREFERRED 54 g 0   • fenofibrate 160 MG tablet Take 1 tablet (160 mg total) by mouth daily 90 tablet 1   • levothyroxine (Synthroid) 200 mcg tablet Take 1 tablet (200 mcg total) by mouth daily 90 tablet 1   • lisinopril-hydrochlorothiazide (PRINZIDE,ZESTORETIC) 10-12 5 MG per tablet Take 1 tablet by mouth daily 90 tablet 1   • loratadine (CLARITIN) 10 mg tablet Take 10 mg by mouth daily     • pioglitazone-metFORMIN (ACTOPLUS MET)  MG per tablet Take 1 tablet by mouth in the morning 90 tablet 0     No current facility-administered medications for this visit       No Known Allergies   Immunizations:     Immunization History   Administered Date(s) Administered   • COVID-19 MODERNA VACC 0 25 ML IM BOOSTER 10/23/2021, 05/03/2022, 10/17/2022   • COVID-19 MODERNA VACC 0 5 ML IM 01/11/2021, 02/08/2021, 10/17/2022   • INFLUENZA 11/02/2021, 10/17/2022   • Influenza Injectable, MDCK, Preservative Free, Quadrivalent, 0 5 mL 09/14/2020   • Influenza, seasonal, injectable 11/14/2018, 12/09/2019   • Pneumococcal Conjugate Vaccine 20-valent (Pcv20), Polysace 02/13/2023      Health Maintenance:         Topic Date Due   • Breast Cancer Screening: Mammogram  Never done   • Hepatitis C Screening  04/01/2030 (Originally 1955)   • HIV Screening  05/03/2030 (Originally 9/12/1970)   • Colorectal Cancer Screening  12/15/2031     There are no preventive care reminders to display for this patient  Medicare Screening Tests and Risk Assessments:     Baltazar Iraheta is here for her Welcome to Medicare visit  Health Risk Assessment:   Patient rates overall health as good  Patient feels that their physical health rating is slightly worse  Patient is very satisfied with their life  Eyesight was rated as same  Hearing was rated as same  Patient feels that their emotional and mental health rating is slightly better  Patients states they are never, rarely angry  Patient states they are sometimes unusually tired/fatigued  Pain experienced in the last 7 days has been none  Patient states that she has experienced no weight loss or gain in last 6 months  Depression Screening:   PHQ-2 Score: 0      Fall Risk Screening: In the past year, patient has experienced: history of falling in past year    Number of falls: 1  Injured during fall?: No    Feels unsteady when standing or walking?: No    Worried about falling?: Yes      Urinary Incontinence Screening:   Patient has not leaked urine accidently in the last six months       Home Safety:  Patient does not have trouble with stairs inside or outside of their home  Patient has working smoke alarms and has working carbon monoxide detector  Home safety hazards include: none  Nutrition:   Current diet is Low Carb  Medications:   Patient is currently taking over-the-counter supplements  OTC medications include: see medication list  Patient is able to manage medications  Activities of Daily Living (ADLs)/Instrumental Activities of Daily Living (IADLs):   Walk and transfer into and out of bed and chair?: Yes  Dress and groom yourself?: Yes    Bathe or shower yourself?: Yes    Feed yourself? Yes  Do your laundry/housekeeping?: Yes  Manage your money, pay your bills and track your expenses?: Yes  Make your own meals?: Yes    Do your own shopping?: Yes    Previous Hospitalizations:   Any hospitalizations or ED visits within the last 12 months?: No      Advance Care Planning:   Living will: Yes    Durable POA for healthcare:  Yes    Advanced directive: Yes    End of Life Decisions reviewed with patient: Yes    Provider agrees with end of life decisions: Yes      Cognitive Screening:   Provider or family/friend/caregiver concerned regarding cognition?: No    PREVENTIVE SCREENINGS      Cardiovascular Screening:    General: History Lipid Disorder and Screening Current      Diabetes Screening:     General: History Diabetes and Screening Current      Colorectal Cancer Screening:     General: Screening Current      Breast Cancer Screening:     General: Risks and Benefits Discussed and Patient Declines      Cervical Cancer Screening:    General: Screening Not Indicated      Osteoporosis Screening:    General: Risks and Benefits Discussed and Patient Declines      Lung Cancer Screening:     General: Screening Not Indicated      Hepatitis C Screening:    General: Screening Current    Hep C Screening Accepted: No     Screening, Brief Intervention, and Referral to Treatment (SBIRT)    Screening  Typical number of drinks in a day: 0  Typical number of drinks in a week: 1  Interpretation: Low risk drinking behavior  Single Item Drug Screening:  How often have you used an illegal drug (including marijuana) or a prescription medication for non-medical reasons in the past year? never    Single Item Drug Screen Score: 0  Interpretation: Negative screen for possible drug use disorder    Vision Screening    Right eye Left eye Both eyes   Without correction      With correction 20/30 20/30 20/30        Physical Exam:     Visit Vitals  /70 (BP Location: Right arm, Patient Position: Sitting, Cuff Size: Large)   Pulse 72   Temp 98 °F (36 7 °C)   Ht 5' 6 14" (1 68 m)   Wt 93 kg (205 lb)   BMI 32 95 kg/m²   OB Status Postmenopausal   Smoking Status Never   BSA 2 02 m²     /70 (BP Location: Right arm, Patient Position: Sitting, Cuff Size: Large)   Pulse 72   Temp 98 °F (36 7 °C)   Ht 5' 6 14" (1 68 m)   Wt 93 kg (205 lb)   BMI 32 95 kg/m²     Physical Exam  Vitals and nursing note reviewed  Constitutional:       General: She is not in acute distress  Appearance: Normal appearance  She is well-developed  Comments: 72-year-old female who appears her stated age with BMI of 32%   HENT:      Head: Normocephalic  Right Ear: Tympanic membrane normal       Left Ear: Tympanic membrane normal       Nose: Nose normal       Mouth/Throat:      Mouth: Mucous membranes are moist    Eyes:      Conjunctiva/sclera: Conjunctivae normal    Neck:      Vascular: No carotid bruit  Cardiovascular:      Rate and Rhythm: Normal rate and regular rhythm  Heart sounds: No murmur heard  Pulmonary:      Effort: Pulmonary effort is normal  No respiratory distress  Breath sounds: Normal breath sounds  Abdominal:      General: Bowel sounds are normal       Palpations: Abdomen is soft  Tenderness: There is no abdominal tenderness  Comments: No bruit   Musculoskeletal:         General: No swelling  Normal range of motion  Skin:     General: Skin is warm and dry  Neurological:      Mental Status: She is alert and oriented to person, place, and time  Psychiatric:         Mood and Affect: Mood normal          Behavior: Behavior normal          Thought Content:  Thought content normal          Judgment: Judgment normal           Za Higuera, DO

## 2023-02-13 NOTE — PATIENT INSTRUCTIONS
Medicare Preventive Visit Patient Instructions  Thank you for completing your Welcome to Medicare Visit or Medicare Annual Wellness Visit today  Your next wellness visit will be due in one year (2/14/2024)  The screening/preventive services that you may require over the next 5-10 years are detailed below  Some tests may not apply to you based off risk factors and/or age  Screening tests ordered at today's visit but not completed yet may show as past due  Also, please note that scanned in results may not display below  Preventive Screenings:  Service Recommendations Previous Testing/Comments   Colorectal Cancer Screening  * Colonoscopy    * Fecal Occult Blood Test (FOBT)/Fecal Immunochemical Test (FIT)  * Fecal DNA/Cologuard Test  * Flexible Sigmoidoscopy Age: 39-70 years old   Colonoscopy: every 10 years (may be performed more frequently if at higher risk)  OR  FOBT/FIT: every 1 year  OR  Cologuard: every 3 years  OR  Sigmoidoscopy: every 5 years  Screening may be recommended earlier than age 39 if at higher risk for colorectal cancer  Also, an individualized decision between you and your healthcare provider will decide whether screening between the ages of 74-80 would be appropriate  Colonoscopy: 12/17/2021  FOBT/FIT: Not on file  Cologuard: Not on file  Sigmoidoscopy: Not on file    Screening Current     Breast Cancer Screening Age: 36 years old  Frequency: every 1-2 years  Not required if history of left and right mastectomy Mammogram: Not on file        Cervical Cancer Screening Between the ages of 21-29, pap smear recommended once every 3 years  Between the ages of 33-67, can perform pap smear with HPV co-testing every 5 years     Recommendations may differ for women with a history of total hysterectomy, cervical cancer, or abnormal pap smears in past  Pap Smear: 04/13/2022    Screening Not Indicated   Hepatitis C Screening Once for adults born between 1945 and 1965  More frequently in patients at high risk for Hepatitis C Hep C Antibody: Not on file    Screening Current   Diabetes Screening 1-2 times per year if you're at risk for diabetes or have pre-diabetes Fasting glucose: No results in last 5 years (No results in last 5 years)  A1C: 7 7 % (2/11/2023)  Screening Not Indicated  History Diabetes   Cholesterol Screening Once every 5 years if you don't have a lipid disorder  May order more often based on risk factors  Lipid panel: 04/21/2021    Screening Not Indicated  History Lipid Disorder     Other Preventive Screenings Covered by Medicare:  1  Abdominal Aortic Aneurysm (AAA) Screening: covered once if your at risk  You're considered to be at risk if you have a family history of AAA  2  Lung Cancer Screening: covers low dose CT scan once per year if you meet all of the following conditions: (1) Age 50-69; (2) No signs or symptoms of lung cancer; (3) Current smoker or have quit smoking within the last 15 years; (4) You have a tobacco smoking history of at least 20 pack years (packs per day multiplied by number of years you smoked); (5) You get a written order from a healthcare provider  3  Glaucoma Screening: covered annually if you're considered high risk: (1) You have diabetes OR (2) Family history of glaucoma OR (3)  aged 48 and older OR (3)  American aged 72 and older  3  Osteoporosis Screening: covered every 2 years if you meet one of the following conditions: (1) You're estrogen deficient and at risk for osteoporosis based off medical history and other findings; (2) Have a vertebral abnormality; (3) On glucocorticoid therapy for more than 3 months; (4) Have primary hyperparathyroidism; (5) On osteoporosis medications and need to assess response to drug therapy  · Last bone density test (DXA Scan): Not on file  5  HIV Screening: covered annually if you're between the age of 12-76   Also covered annually if you are younger than 13 and older than 72 with risk factors for HIV infection  For pregnant patients, it is covered up to 3 times per pregnancy  Immunizations:  Immunization Recommendations   Influenza Vaccine Annual influenza vaccination during flu season is recommended for all persons aged >= 6 months who do not have contraindications   Pneumococcal Vaccine   * Pneumococcal conjugate vaccine = PCV13 (Prevnar 13), PCV15 (Vaxneuvance), PCV20 (Prevnar 20)  * Pneumococcal polysaccharide vaccine = PPSV23 (Pneumovax) Adults 25-60 years old: 1-3 doses may be recommended based on certain risk factors  Adults 72 years old: 1-2 doses may be recommended based off what pneumonia vaccine you previously received   Hepatitis B Vaccine 3 dose series if at intermediate or high risk (ex: diabetes, end stage renal disease, liver disease)   Tetanus (Td) Vaccine - COST NOT COVERED BY MEDICARE PART B Following completion of primary series, a booster dose should be given every 10 years to maintain immunity against tetanus  Td may also be given as tetanus wound prophylaxis  Tdap Vaccine - COST NOT COVERED BY MEDICARE PART B Recommended at least once for all adults  For pregnant patients, recommended with each pregnancy  Shingles Vaccine (Shingrix) - COST NOT COVERED BY MEDICARE PART B  2 shot series recommended in those aged 48 and above     Health Maintenance Due:      Topic Date Due   • Breast Cancer Screening: Mammogram  Never done   • Hepatitis C Screening  04/01/2030 (Originally 1955)   • HIV Screening  05/03/2030 (Originally 9/12/1970)   • Colorectal Cancer Screening  12/15/2031     Immunizations Due:      Topic Date Due   • Pneumococcal Vaccine: 65+ Years (1 - PCV) Never done     Advance Directives   What are advance directives? Advance directives are legal documents that state your wishes and plans for medical care  These plans are made ahead of time in case you lose your ability to make decisions for yourself   Advance directives can apply to any medical decision, such as the treatments you want, and if you want to donate organs  What are the types of advance directives? There are many types of advance directives, and each state has rules about how to use them  You may choose a combination of any of the following:  · Living will: This is a written record of the treatment you want  You can also choose which treatments you do not want, which to limit, and which to stop at a certain time  This includes surgery, medicine, IV fluid, and tube feedings  · Durable power of  for healthcare [facility]): This is a written record that states who you want to make healthcare choices for you when you are unable to make them for yourself  This person, called a proxy, is usually a family member or a friend  You may choose more than 1 proxy  · Do not resuscitate (DNR) order:  A DNR order is used in case your heart stops beating or you stop breathing  It is a request not to have certain forms of treatment, such as CPR  A DNR order may be included in other types of advance directives  · Medical directive: This covers the care that you want if you are in a coma, near death, or unable to make decisions for yourself  You can list the treatments you want for each condition  Treatment may include pain medicine, surgery, blood transfusions, dialysis, IV or tube feedings, and a ventilator (breathing machine)  · Values history: This document has questions about your views, beliefs, and how you feel and think about life  This information can help others choose the care that you would choose  Why are advance directives important? An advance directive helps you control your care  Although spoken wishes may be used, it is better to have your wishes written down  Spoken wishes can be misunderstood, or not followed  Treatments may be given even if you do not want them  An advance directive may make it easier for your family to make difficult choices about your care     Fall Prevention    Fall prevention includes ways to make your home and other areas safer  It also includes ways you can move more carefully to prevent a fall  Health conditions that cause changes in your blood pressure, vision, or muscle strength and coordination may increase your risk for falls  Medicines may also increase your risk for falls if they make you dizzy, weak, or sleepy  Fall prevention tips:   · Stand or sit up slowly  · Use assistive devices as directed  · Wear shoes that fit well and have soles that   · Wear a personal alarm  · Stay active  · Manage your medical conditions  Home Safety Tips:  · Add items to prevent falls in the bathroom  · Keep paths clear  · Install bright lights in your home  · Keep items you use often on shelves within reach  · Paint or place reflective tape on the edges of your stairs  Weight Management   Why it is important to manage your weight:  Being overweight increases your risk of health conditions such as heart disease, high blood pressure, type 2 diabetes, and certain types of cancer  It can also increase your risk for osteoarthritis, sleep apnea, and other respiratory problems  Aim for a slow, steady weight loss  Even a small amount of weight loss can lower your risk of health problems  How to lose weight safely:  A safe and healthy way to lose weight is to eat fewer calories and get regular exercise  You can lose up about 1 pound a week by decreasing the number of calories you eat by 500 calories each day  Healthy meal plan for weight management:  A healthy meal plan includes a variety of foods, contains fewer calories, and helps you stay healthy  A healthy meal plan includes the following:  · Eat whole-grain foods more often  A healthy meal plan should contain fiber  Fiber is the part of grains, fruits, and vegetables that is not broken down by your body  Whole-grain foods are healthy and provide extra fiber in your diet   Some examples of whole-grain foods are whole-wheat breads and pastas, oatmeal, brown rice, and bulgur  · Eat a variety of vegetables every day  Include dark, leafy greens such as spinach, kale, denton greens, and mustard greens  Eat yellow and orange vegetables such as carrots, sweet potatoes, and winter squash  · Eat a variety of fruits every day  Choose fresh or canned fruit (canned in its own juice or light syrup) instead of juice  Fruit juice has very little or no fiber  · Eat low-fat dairy foods  Drink fat-free (skim) milk or 1% milk  Eat fat-free yogurt and low-fat cottage cheese  Try low-fat cheeses such as mozzarella and other reduced-fat cheeses  · Choose meat and other protein foods that are low in fat  Choose beans or other legumes such as split peas or lentils  Choose fish, skinless poultry (chicken or turkey), or lean cuts of red meat (beef or pork)  Before you cook meat or poultry, cut off any visible fat  · Use less fat and oil  Try baking foods instead of frying them  Add less fat, such as margarine, sour cream, regular salad dressing and mayonnaise to foods  Eat fewer high-fat foods  Some examples of high-fat foods include french fries, doughnuts, ice cream, and cakes  · Eat fewer sweets  Limit foods and drinks that are high in sugar  This includes candy, cookies, regular soda, and sweetened drinks  Exercise:  Exercise at least 30 minutes per day on most days of the week  Some examples of exercise include walking, biking, dancing, and swimming  You can also fit in more physical activity by taking the stairs instead of the elevator or parking farther away from stores  Ask your healthcare provider about the best exercise plan for you  © Copyright [employer] 2018 Information is for End User's use only and may not be sold, redistributed or otherwise used for commercial purposes   All illustrations and images included in CareNotes® are the copyrighted property of A D A M , Inc  or [address] [place] [address] [place]

## 2023-02-14 ENCOUNTER — TELEPHONE (OUTPATIENT)
Dept: ADMINISTRATIVE | Facility: OTHER | Age: 68
End: 2023-02-14

## 2023-02-14 LAB
CREAT UR-MCNC: 105 MG/DL
MICROALBUMIN UR-MCNC: 24.7 MG/L (ref 0–20)
MICROALBUMIN/CREAT 24H UR: 24 MG/G CREATININE (ref 0–30)

## 2023-02-14 NOTE — LETTER
Diabetic Eye Exam Form    Date Requested: 23  Patient: David Leonard  Patient : 1955   Referring Provider: Jennifer Fraser DO      DIABETIC Eye Exam Date _______________________________      Type of Exam MUST be documented for Diabetic Eye Exams  Please CHECK ONE  Retinal Exam       Dilated Retinal Exam       OCT       Optomap-Iris Exam      Fundus Photography       Left Eye - Please check Retinopathy or No Retinopathy        Exam did show retinopathy    Exam did not show retinopathy       Right Eye - Please check Retinopathy or No Retinopathy       Exam did show retinopathy    Exam did not show retinopathy       Comments __________________________________________________________    Practice Providing Exam ______________________________________________    Exam Performed By (print name) _______________________________________      Provider Signature ___________________________________________________      These reports are needed for  compliance  Please fax this completed form and a copy of the Diabetic Eye Exam report to our office located at [place] [address] as soon as possible via Fax [phone] attention Luis Phan: Phone [phone]  We thank you for your assistance in treating our mutual patient

## 2023-02-14 NOTE — TELEPHONE ENCOUNTER
----- Message from Jamarcus Benitez DO sent at 8/37/5556 12:56 PM EST -----  Regarding: care gap request  02/13/23 12:56 PM    Hello, our patient attached above has had Diabetic Eye Exam completed/performed  Please assist in updating the patient chart by making an External outreach to Dr Baldo Lundberg facility located in [facility]  The date of service is Jan/Feb 2023      Thank you,  Jamarcus Benitez  [facility]

## 2023-02-14 NOTE — TELEPHONE ENCOUNTER
Upon review of the In Basket request and the patient's chart, initial outreach has been made via fax to facility  Please see Contacts section for details       Thank you  Dante Albarran MA

## 2023-02-14 NOTE — LETTER
Diabetic Eye Exam Form    Date Requested: 23  Patient: Felecia Mirza  Patient : 1955   Referring Provider: Perico Barahona DO      DIABETIC Eye Exam Date _______________________________      Type of Exam MUST be documented for Diabetic Eye Exams  Please CHECK ONE  Retinal Exam       Dilated Retinal Exam       OCT       Optomap-Iris Exam      Fundus Photography       Left Eye - Please check Retinopathy or No Retinopathy        Exam did show retinopathy    Exam did not show retinopathy       Right Eye - Please check Retinopathy or No Retinopathy       Exam did show retinopathy    Exam did not show retinopathy       Comments __________________________________________________________    Practice Providing Exam ______________________________________________    Exam Performed By (print name) _______________________________________      Provider Signature ___________________________________________________      These reports are needed for  compliance  Please fax this completed form and a copy of the Diabetic Eye Exam report to our office located at [place] [address] as soon as possible via Fax [phone] christiana Almaguer: Phone [phone]  We thank you for your assistance in treating our mutual patient

## 2023-02-20 NOTE — TELEPHONE ENCOUNTER
As a follow-up, a second attempt has been made for outreach via fax to facility  Please see Contacts section for details      Thank you  Nomi Greer MA

## 2023-02-27 NOTE — TELEPHONE ENCOUNTER
As a final attempt, a third outreach has been made via fax to facility  Please see Contacts section for details  This encounter will be closed and completed by end of day  Should we receive the requested information because of previous outreach attempts, the requested patient's chart will be updated appropriately       Thank you  Poornima Wilson MA

## 2023-03-07 NOTE — TELEPHONE ENCOUNTER
As a final attempt, a third outreach has been made via telephone call to facility  Please see Contacts section for details  This encounter will be closed and completed by end of day  Should we receive the requested information because of previous outreach attempts, the requested patient's chart will be updated appropriately       Thank you  Orlando eLhman MA

## 2023-03-13 NOTE — TELEPHONE ENCOUNTER
As a final attempt, a third outreach has been made via telephone call to facility  Please see Contacts section for details  This encounter will be closed and completed by end of day  Should we receive the requested information because of previous outreach attempts, the requested patient's chart will be updated appropriately       Thank you  Carlos Alston MA     Called again - since we did not receive report -  looking into it

## 2023-03-15 NOTE — TELEPHONE ENCOUNTER
Upon review of the In Basket request we have found that the patient has not yet had the requested item completed or has not established care  Due to protocols, we are unable to hold requests for resulting/linking of a future items and are unable to proceed  Patients who have not established care within the Network do not have consents on file, record requests, etc     Any additional questions or concerns should be emailed to the Practice Liaisons via the appropriate education email address, please do not reply via In Basket      Thank you  Nomi Greer MA

## 2023-03-16 NOTE — TELEPHONE ENCOUNTER
Upon review of the In Basket request we were able to locate, review, and update the patient chart as requested for Diabetic Eye Exam     Any additional questions or concerns should be emailed to the Practice Liaisons via the appropriate education email address, please do not reply via In Basket      Thank you  Hamilton Melendez MA

## 2023-06-04 DIAGNOSIS — E11.9 TYPE 2 DIABETES MELLITUS WITHOUT COMPLICATION, WITHOUT LONG-TERM CURRENT USE OF INSULIN (HCC): ICD-10-CM

## 2023-06-06 RX ORDER — PIOGLITAZONE HCL AND METFORMIN HCL 500; 15 MG/1; MG/1
1 TABLET ORAL DAILY
Qty: 90 TABLET | Refills: 0 | Status: SHIPPED | OUTPATIENT
Start: 2023-06-06

## 2023-09-27 DIAGNOSIS — E11.9 TYPE 2 DIABETES MELLITUS WITHOUT COMPLICATION, WITHOUT LONG-TERM CURRENT USE OF INSULIN (HCC): ICD-10-CM

## 2023-09-27 RX ORDER — PIOGLITAZONE HCL AND METFORMIN HCL 500; 15 MG/1; MG/1
1 TABLET ORAL EVERY MORNING
Qty: 90 TABLET | Refills: 3 | Status: SHIPPED | OUTPATIENT
Start: 2023-09-27 | End: 2023-10-03 | Stop reason: SDUPTHER

## 2023-10-03 DIAGNOSIS — E11.9 TYPE 2 DIABETES MELLITUS WITHOUT COMPLICATION, WITHOUT LONG-TERM CURRENT USE OF INSULIN (HCC): ICD-10-CM

## 2023-10-03 RX ORDER — PIOGLITAZONE HCL AND METFORMIN HCL 500; 15 MG/1; MG/1
1 TABLET ORAL EVERY MORNING
Qty: 30 TABLET | Refills: 0 | Status: SHIPPED | OUTPATIENT
Start: 2023-10-03

## 2023-10-16 DIAGNOSIS — E03.9 ACQUIRED HYPOTHYROIDISM: ICD-10-CM

## 2023-10-16 DIAGNOSIS — I10 PRIMARY HYPERTENSION: ICD-10-CM

## 2023-10-16 DIAGNOSIS — E78.2 MODERATE MIXED HYPERLIPIDEMIA NOT REQUIRING STATIN THERAPY: ICD-10-CM

## 2023-10-16 RX ORDER — LEVOTHYROXINE SODIUM 0.2 MG/1
TABLET ORAL
Qty: 90 TABLET | Refills: 0 | Status: SHIPPED | OUTPATIENT
Start: 2023-10-16

## 2023-10-16 RX ORDER — FENOFIBRATE 160 MG/1
TABLET ORAL
Qty: 90 TABLET | Refills: 0 | Status: SHIPPED | OUTPATIENT
Start: 2023-10-16

## 2023-10-16 RX ORDER — LISINOPRIL AND HYDROCHLOROTHIAZIDE 12.5; 1 MG/1; MG/1
TABLET ORAL
Qty: 90 TABLET | Refills: 0 | Status: SHIPPED | OUTPATIENT
Start: 2023-10-16

## 2023-10-29 DIAGNOSIS — E11.9 TYPE 2 DIABETES MELLITUS WITHOUT COMPLICATION, WITHOUT LONG-TERM CURRENT USE OF INSULIN (HCC): ICD-10-CM

## 2023-10-30 RX ORDER — PIOGLITAZONE HCL AND METFORMIN HCL 500; 15 MG/1; MG/1
1 TABLET ORAL EVERY MORNING
Qty: 90 TABLET | Refills: 1 | Status: SHIPPED | OUTPATIENT
Start: 2023-10-30

## 2023-11-06 DIAGNOSIS — E11.9 TYPE 2 DIABETES MELLITUS WITHOUT COMPLICATION, WITHOUT LONG-TERM CURRENT USE OF INSULIN (HCC): ICD-10-CM

## 2023-11-06 RX ORDER — PIOGLITAZONE HCL AND METFORMIN HCL 500; 15 MG/1; MG/1
1 TABLET ORAL EVERY MORNING
Qty: 90 TABLET | Refills: 0 | Status: SHIPPED | OUTPATIENT
Start: 2023-11-06

## 2023-11-24 ENCOUNTER — TELEPHONE (OUTPATIENT)
Dept: FAMILY MEDICINE CLINIC | Facility: CLINIC | Age: 68
End: 2023-11-24

## 2023-11-24 DIAGNOSIS — E11.9 TYPE 2 DIABETES MELLITUS WITHOUT COMPLICATION, WITHOUT LONG-TERM CURRENT USE OF INSULIN (HCC): Primary | ICD-10-CM

## 2023-11-24 NOTE — TELEPHONE ENCOUNTER
Patient has upcoming appt with you on Monday. She would like to go get the blood work need tomorrow morning. Can you place the orders?

## 2023-11-25 LAB — HBA1C MFR BLD HPLC: 7.4 %

## 2023-11-27 ENCOUNTER — OFFICE VISIT (OUTPATIENT)
Dept: FAMILY MEDICINE CLINIC | Facility: CLINIC | Age: 68
End: 2023-11-27
Payer: COMMERCIAL

## 2023-11-27 DIAGNOSIS — Z78.0 ENCOUNTER FOR OSTEOPOROSIS SCREENING IN ASYMPTOMATIC POSTMENOPAUSAL PATIENT: ICD-10-CM

## 2023-11-27 DIAGNOSIS — J45.20 MILD INTERMITTENT ASTHMA, UNSPECIFIED WHETHER COMPLICATED: ICD-10-CM

## 2023-11-27 DIAGNOSIS — M23.92 INTERNAL DERANGEMENT OF LEFT KNEE: ICD-10-CM

## 2023-11-27 DIAGNOSIS — Z12.31 ENCOUNTER FOR SCREENING MAMMOGRAM FOR BREAST CANCER: ICD-10-CM

## 2023-11-27 DIAGNOSIS — Z13.820 ENCOUNTER FOR OSTEOPOROSIS SCREENING IN ASYMPTOMATIC POSTMENOPAUSAL PATIENT: ICD-10-CM

## 2023-11-27 DIAGNOSIS — Z23 ENCOUNTER FOR IMMUNIZATION: ICD-10-CM

## 2023-11-27 DIAGNOSIS — E11.9 TYPE 2 DIABETES MELLITUS WITHOUT COMPLICATION, WITHOUT LONG-TERM CURRENT USE OF INSULIN (HCC): Primary | ICD-10-CM

## 2023-11-27 PROBLEM — E11.65 TYPE 2 DIABETES MELLITUS WITH HYPERGLYCEMIA, WITHOUT LONG-TERM CURRENT USE OF INSULIN (HCC): Status: ACTIVE | Noted: 2021-12-17

## 2023-11-27 PROCEDURE — 90471 IMMUNIZATION ADMIN: CPT

## 2023-11-27 PROCEDURE — 90678 RSV VACC PREF BIVALENT IM: CPT

## 2023-11-27 PROCEDURE — 99214 OFFICE O/P EST MOD 30 MIN: CPT | Performed by: FAMILY MEDICINE

## 2023-11-27 RX ORDER — FLUTICASONE PROPIONATE AND SALMETEROL 100; 50 UG/1; UG/1
1 POWDER RESPIRATORY (INHALATION) 2 TIMES DAILY
Qty: 60 BLISTER | Refills: 0 | Status: SHIPPED | OUTPATIENT
Start: 2023-11-27

## 2023-11-27 RX ORDER — PIOGLITAZONE HCL AND METFORMIN HCL 500; 15 MG/1; MG/1
1 TABLET ORAL EVERY MORNING
Qty: 90 TABLET | Refills: 0 | Status: SHIPPED | OUTPATIENT
Start: 2023-11-27

## 2023-11-27 NOTE — PROGRESS NOTES
Name: Kin Day      : 1955      MRN: [id number]  Encounter Provider: Latricia Barajas DO  Encounter Date: 2023   Encounter department: [address] [place] [address]     1. Type 2 diabetes mellitus without complication, without long-term current use of insulin (HCC)  -     Albumin / creatinine urine ratio; Future  -     pioglitazone-metFORMIN (ACTOPLUS MET)  MG per tablet; Take 1 tablet by mouth every morning    2. Encounter for screening mammogram for breast cancer  -     Mammo screening bilateral w 3d & cad; Future; Expected date: 2023    3. Mild intermittent asthma, unspecified whether complicated  -     Fluticasone-Salmeterol (Advair Diskus) 100-50 mcg/dose inhaler; Inhale 1 puff 2 (two) times a day Rinse mouth after use. 4. Encounter for immunization  -     Respiratory Syncytial Virus (RSV) vaccine (recombinant) (Abrysvo)    5. Internal derangement of left knee  -     MRI knee left  wo contrast; Future; Expected date: 2023    6. Encounter for osteoporosis screening in asymptomatic postmenopausal patient  -     DXA bone density spine hip and pelvis; Future; Expected date: 2023      BMI Counseling: Body mass index is 33.73 kg/m². The BMI is above normal. Nutrition recommendations include decreasing portion sizes, encouraging healthy choices of fruits and vegetables, decreasing fast food intake, consuming healthier snacks, limiting drinks that contain sugar, moderation in carbohydrate intake, increasing intake of lean protein, reducing intake of saturated and trans fat and reducing intake of cholesterol. Exercise recommendations include exercising 3-5 times per week. Patient referred to PCP. Rationale for BMI follow-up plan is due to patient being overweight or obese. Depression Screening and Follow-up Plan: Patient was screened for depression during today's encounter. They screened negative with a PHQ-2 score of 0.         Subjective 57-year-old here for 6-month follow-up. Lab review. Hemoglobin A1c 7.4 patient is very compliant with lifestyle. Had somewhat of the knee injury needs MRI so she is been out of commission a bit. Chief Complaint   Patient presents with    Follow-up     6 month follow up      Son engaged and  clean bill of health  Review of Systems   Musculoskeletal:  Positive for arthralgias (hurt knee in june Saw Dr Torsten Gore, needs MRI). All other systems reviewed and are negative. Assessment/Plan: Ellen Perez DO   [address] [facility] [address]   Acute pain of left knee  - XR KNEE 4+ VW LEFT; Future; Expected date: 07/06/2023  - AMB REF [facility]   Reading Date: 07/06/2023  X-ray of the left knee shows normal alignment. There is no evidence of fracture. Mild degenerative changes. Impression: Mild left knee DJD      Doing PT religiously and bracing (greater than 6 weeks physician guided program)  [facility] [address] [facility]   [address] [place], [place] [address]   Was doing pickle ball and walking prior  Stationary bike now  tains abnormal data HEMOGLOBIN A1C W/EAG  Specimen: Serum specimen (specimen)  Component  Ref Range & Units 2 d ago Comments   Hemoglobin A1C  <5.7 % 7.4 High       Specimen: Serum specimen (specimen)  Component  Ref Range & Units 2 d ago Comments   Glucose  65 - 99 mg/dL 155 High     BUN  7 - 25 mg/dL 20    Creatinine  0.40 - 1.10 mg/dL 0.67    Sodium  135 - 145 mmol/L 139    Potassium  3.5 - 5.2 mmol/L 4.9    Chloride  100 - 109 mmol/L 104    Carbon Dioxide  21 - 31 mmol/L 29    Calcium  8.5 - 10.1 mg/dL 9.9    Alkaline Phosphatase  35 - 120 U/L 40    Albumin  3.5 - 5.7 g/dL 4.4    Bilirubin, Total  0.2 - 1.0 mg/dL 0.3 Eltrombopag and its metabolites may interfere with this assay causing erroneously high patient results.    Protein, Total  6.3 - 8.3 g/dL 7.1    AST  <41 U/L 15    ALT  <56 U/L 15    Anion Gap  3 - 11 6    eGFRcr  >59 96 Current Outpatient Medications on File Prior to Visit   Medication Sig    fenofibrate 160 MG tablet TAKE 1 TABLET DAILY    levothyroxine 200 mcg tablet TAKE 1 TABLET DAILY    lisinopril-hydrochlorothiazide (PRINZIDE,ZESTORETIC) 10-12.5 MG per tablet TAKE 1 TABLET DAILY    loratadine (CLARITIN) 10 mg tablet Take 10 mg by mouth daily       Objective     /68   Pulse 79   Temp (!) 96.5 °F (35.8 °C) (Temporal)   Resp 16   Ht 5' 6" (1.676 m)   Wt 94.8 kg (209 lb)   SpO2 98%   BMI 33.73 kg/m²     Physical Exam  Constitutional:       Appearance: Normal appearance. Cardiovascular:      Rate and Rhythm: Normal rate and regular rhythm. Musculoskeletal:      Comments: Slightly antalgic on the left. Patient does have some anterior and lateral tenderness at midline. Some crepitus   Neurological:      Mental Status: She is alert and oriented to person, place, and time. Psychiatric:         Mood and Affect: Mood normal.         Behavior: Behavior normal.         Thought Content:  Thought content normal.         Judgment: Judgment normal.       Estephanie Valadez, DO

## 2023-11-28 ENCOUNTER — TELEPHONE (OUTPATIENT)
Dept: ADMINISTRATIVE | Facility: OTHER | Age: 68
End: 2023-11-28

## 2023-11-28 NOTE — LETTER
Diabetic Eye Exam Form    Date Requested: 23  Patient: Elodia Hightower  Patient : 1955   Referring Provider: Mylene Hua DO      DIABETIC Eye Exam Date _______________________________      Type of Exam MUST be documented for Diabetic Eye Exams. Please CHECK ONE.     Retinal Exam       Dilated Retinal Exam       OCT       Optomap-Iris Exam      Fundus Photography       Left Eye - Please check Retinopathy or No Retinopathy        Exam did show retinopathy    Exam did not show retinopathy       Right Eye - Please check Retinopathy or No Retinopathy       Exam did show retinopathy    Exam did not show retinopathy       Comments __________________________________________________________    Practice Providing Exam ______________________________________________    Exam Performed By (print name) _______________________________________      Provider Signature ___________________________________________________      These reports are needed for  compliance.  Please fax this completed form and a copy of the Diabetic Eye Exam report to our office located at [address] [place] [address] as soon as possible via Fax [phone] attention Maddi: Phone [phone]  We thank you for your assistance in treating our mutual patient.

## 2023-11-28 NOTE — LETTER
Diabetic Eye Exam Form    Date Requested: 23  Patient: Elodia Hightower  Patient : 1955   Referring Provider: Mylene Hua DO      DIABETIC Eye Exam Date _______________________________      Type of Exam MUST be documented for Diabetic Eye Exams. Please CHECK ONE.     Retinal Exam       Dilated Retinal Exam       OCT       Optomap-Iris Exam      Fundus Photography       Left Eye - Please check Retinopathy or No Retinopathy        Exam did show retinopathy    Exam did not show retinopathy       Right Eye - Please check Retinopathy or No Retinopathy       Exam did show retinopathy    Exam did not show retinopathy       Comments __________________________________________________________    Practice Providing Exam ______________________________________________    Exam Performed By (print name) _______________________________________      Provider Signature ___________________________________________________      These reports are needed for  compliance.  Please fax this completed form and a copy of the Diabetic Eye Exam report to our office located at [address] [place] [address] as soon as possible via Fax [phone] attention Maddi: Phone [phone]  We thank you for your assistance in treating our mutual patient.

## 2023-11-28 NOTE — TELEPHONE ENCOUNTER
----- Message from Mylene Hua DO sent at 11/27/2023  4:57 PM EST -----  Regarding: care gap request  11/27/23 4:57 PM    Hello, our patient attached above has had Diabetic Eye Exam completed/performed. Please assist in updating the patient chart by making an External outreach to [facility] located in [facility] . The date of service is 2023.    Thank you,  Mylene Hua [facility]

## 2023-11-28 NOTE — TELEPHONE ENCOUNTER
Upon review of the In Basket request and the patient's chart, initial outreach has been made via fax to facility. Please see Contacts section for details.     Thank you  Maddi Daigle

## 2023-11-30 VITALS
HEIGHT: 66 IN | SYSTOLIC BLOOD PRESSURE: 126 MMHG | TEMPERATURE: 96.5 F | HEART RATE: 79 BPM | RESPIRATION RATE: 16 BRPM | WEIGHT: 209 LBS | BODY MASS INDEX: 33.59 KG/M2 | DIASTOLIC BLOOD PRESSURE: 68 MMHG | OXYGEN SATURATION: 98 %

## 2023-12-12 NOTE — TELEPHONE ENCOUNTER
As a follow-up, a second attempt has been made for outreach via fax to facility. Please see Contacts section for details.    Thank you  Maddi Daigle

## 2023-12-15 ENCOUNTER — VBI (OUTPATIENT)
Dept: ADMINISTRATIVE | Facility: OTHER | Age: 68
End: 2023-12-15

## 2023-12-18 NOTE — TELEPHONE ENCOUNTER
As a final attempt, a third outreach has been made via telephone call to facility. Please see Contacts section for details. This encounter will be closed and completed by end of day. Should we receive the requested information because of previous outreach attempts, the requested patient's chart will be updated appropriately.     Thank you  Maddi Daigle

## 2023-12-28 ENCOUNTER — HOSPITAL ENCOUNTER (OUTPATIENT)
Facility: MEDICAL CENTER | Age: 68
Discharge: HOME | End: 2023-12-28
Payer: COMMERCIAL

## 2023-12-28 DIAGNOSIS — M23.92 INTERNAL DERANGEMENT OF LEFT KNEE: ICD-10-CM

## 2023-12-28 PROCEDURE — 73721 MRI JNT OF LWR EXTRE W/O DYE: CPT

## 2023-12-28 PROCEDURE — G1004 CDSM NDSC: HCPCS

## 2024-01-08 ENCOUNTER — VBI (OUTPATIENT)
Dept: ADMINISTRATIVE | Facility: OTHER | Age: 69
End: 2024-01-08

## 2024-03-12 ENCOUNTER — TELEPHONE (OUTPATIENT)
Age: 69
End: 2024-03-12

## 2024-03-12 DIAGNOSIS — I10 PRIMARY HYPERTENSION: ICD-10-CM

## 2024-03-12 DIAGNOSIS — E11.9 TYPE 2 DIABETES MELLITUS WITHOUT COMPLICATION, WITHOUT LONG-TERM CURRENT USE OF INSULIN (HCC): ICD-10-CM

## 2024-03-12 RX ORDER — LISINOPRIL AND HYDROCHLOROTHIAZIDE 12.5; 1 MG/1; MG/1
TABLET ORAL
Qty: 90 TABLET | Refills: 1 | Status: SHIPPED | OUTPATIENT
Start: 2024-03-12

## 2024-03-12 RX ORDER — PIOGLITAZONE HCL AND METFORMIN HCL 500; 15 MG/1; MG/1
1 TABLET ORAL EVERY MORNING
Qty: 90 TABLET | Refills: 1 | Status: SHIPPED | OUTPATIENT
Start: 2024-03-12

## 2024-03-13 NOTE — TELEPHONE ENCOUNTER
PA for lisinopril-hydrochlorothiazide (PRINZIDE,ZESTORETIC) 10-12.5 MG     Submitted via    [x]CMM-KEY [id number]  []Surescripts-Case ID #   []Faxed to plan   []Other website   []Phone call Case ID #     Office notes sent, clinical questions answered. Awaiting determination    Turnaround time for your insurance to make a decision on your Prior Authorization can take 7-21 business days.

## 2024-03-14 NOTE — TELEPHONE ENCOUNTER
PA for  lisinopril-hydrochlorothiazide (PRINZIDE,ZESTORETIC) 10-12.5 MG  not required     Reason- (screenshot if applicable)              Message sent to provider pool No

## 2024-04-01 ENCOUNTER — RA CDI HCC (OUTPATIENT)
Dept: OTHER | Facility: HOSPITAL | Age: 69
End: 2024-04-01

## 2024-04-05 ENCOUNTER — OFFICE VISIT (OUTPATIENT)
Dept: FAMILY MEDICINE CLINIC | Facility: CLINIC | Age: 69
End: 2024-04-05
Payer: COMMERCIAL

## 2024-04-05 VITALS
OXYGEN SATURATION: 97 % | SYSTOLIC BLOOD PRESSURE: 132 MMHG | TEMPERATURE: 97.5 F | HEART RATE: 92 BPM | WEIGHT: 204.6 LBS | DIASTOLIC BLOOD PRESSURE: 68 MMHG | BODY MASS INDEX: 32.88 KG/M2 | HEIGHT: 66 IN

## 2024-04-05 DIAGNOSIS — E03.9 ACQUIRED HYPOTHYROIDISM: ICD-10-CM

## 2024-04-05 DIAGNOSIS — E78.01 FAMILIAL HYPERCHOLESTEROLEMIA: ICD-10-CM

## 2024-04-05 DIAGNOSIS — Z00.00 ENCOUNTER FOR MEDICARE ANNUAL WELLNESS EXAM: Primary | ICD-10-CM

## 2024-04-05 DIAGNOSIS — E11.65 TYPE 2 DIABETES MELLITUS WITH HYPERGLYCEMIA, WITHOUT LONG-TERM CURRENT USE OF INSULIN (HCC): ICD-10-CM

## 2024-04-05 LAB — SL AMB POCT HEMOGLOBIN AIC: 7.3 (ref ?–6.5)

## 2024-04-05 PROCEDURE — 83036 HEMOGLOBIN GLYCOSYLATED A1C: CPT | Performed by: FAMILY MEDICINE

## 2024-04-05 PROCEDURE — G0439 PPPS, SUBSEQ VISIT: HCPCS | Performed by: FAMILY MEDICINE

## 2024-04-05 NOTE — PROGRESS NOTES
Assessment and Plan:   Elodia was seen today for medicare wellness visit.    Diagnoses and all orders for this visit:    Encounter for Medicare annual wellness exam    Type 2 diabetes mellitus with hyperglycemia, without long-term current use of insulin (HCC)  -     POCT hemoglobin A1c  -     Lipid panel; Future  -     Albumin / creatinine urine ratio; Future    Familial hypercholesterolemia  -     Lipid panel; Future    Acquired hypothyroidism  -     TSH, 3rd generation; Future     Return in about 1 year (around 4/5/2025) for AWV.  And 6 months for recheck.  Problem List Items Addressed This Visit        Endocrine    Type 2 diabetes mellitus with hyperglycemia, without long-term current use of insulin (HCC)    Relevant Orders    POCT hemoglobin A1c (Completed)    Lipid panel    Albumin / creatinine urine ratio    Hypothyroidism    Relevant Orders    TSH, 3rd generation       Other    Hyperlipidemia    Relevant Orders    Lipid panel   Other Visit Diagnoses     Encounter for Medicare annual wellness exam    -  Primary          Depression Screening and Follow-up Plan: Patient was screened for depression during today's encounter. They screened negative with a PHQ-2 score of 0.      Preventive health issues were discussed with patient, and age appropriate screening tests were ordered as noted in patient's After Visit Summary.  Personalized health advice and appropriate referrals for health education or preventive services given if needed, as noted in patient's After Visit Summary.     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 Will see half her patient's  virtually.     On [id number] calorie and exercise doing for 9 weeks-feels this is making an impact, and continues to be motivated.  Son getting  next year.  Just back from [place].  Chief Complaint   Patient presents with   • Medicare Wellness Visit    David is  ok-sees neurology    Patient Care Team:  Mylene Hua DO as PCP - General (Family Medicine)     Review of Systems:     Component      Latest Ref Rng 11/25/2023   GLUCOSE      65 - 99 mg/dL 155 (H) (E)   BUN      7 - 25 mg/dL 20 (E)   Creatinine      0.40 - 1.10 mg/dL 0.67 (E)   Sodium      135 - 145 mmol/L 139 (E)   Potassium      3.5 - 5.2 mmol/L 4.9 (E)   Chloride      100 - 109 mmol/L 104 (E)   Carbon Dioxide      21 - 31 mmol/L 29 (E)   Calcium      8.5 - 10.1 mg/dL 9.9 (E)   ALK PHOS      35 - 120 U/L 40 (E)   Albumin      3.5 - 5.7 g/dL 4.4 (E)   Total Bilirubin      0.2 - 1.0 mg/dL 0.3 (E)   Total Protein      6.3 - 8.3 g/dL 7.1 (E)   AST      <41 U/L 15 (E)   ALT      <56 U/L 15 (E)   ANION GAP      3 - 11  6 (E)   GFR, Calculated      >59  96 (E)   eGFR Comment Interpretive information: calculated GFR (E)              Component      Latest Ref Rng 2/13/2023 11/25/2023 4/5/2024   Hemoglobin A1C      6.5  7.5 !  7.4 (E) 7.3 !        Review of Systems   Constitutional:         Making concerted effort on calorie counting and moving   Respiratory:  Negative for shortness of breath.    Cardiovascular:  Negative for chest pain.   Musculoskeletal: Negative.    Hematological: Negative.    Issues with chronic pain in the knee have been followed through [facility] physiatry and PT-she is doing pickleball and it is going well     Problem List:     Patient Active Problem List   Diagnosis   • Asthma   • Type 2 diabetes mellitus with hyperglycemia, without long-term current use of insulin (HCC)   • Hyperlipidemia   • HTN (hypertension)   • Hypothyroidism   • Anxiety   • Plantar fasciitis, right      Past Medical and Surgical History:     Past Medical History:   Diagnosis Date   • Allergic    • Anxiety    • Anxiety 12/17/2021   • Arthritis     both feet   • Asthma 12/17/2021   • Diabetes mellitus, type 2 (HCC) 12/17/2021   • Disease of thyroid gland    • GERD (gastroesophageal reflux disease)    • HTN (hypertension)  2021   • Hyperlipidemia    • Hyperlipidemia 2021   • Hypertension    • Hypothyroidism 2021     Past Surgical History:   Procedure Laterality Date   •  SECTION  08/10/1991   • COLONOSCOPY        Family History:     Family History   Problem Relation Age of Onset   • Hypertension Mother    • COPD Mother    • Hypertension Father    • Autism spectrum disorder Brother    • Hearing loss Brother    • Diabetes Maternal Grandfather    • Stomach cancer Paternal Grandmother       Social History:     Social History     Socioeconomic History   • Marital status: Unknown     Spouse name: None   • Number of children: None   • Years of education: None   • Highest education level: None   Occupational History   • None   Tobacco Use   • Smoking status: Never   • Smokeless tobacco: Never   Vaping Use   • Vaping status: Never Used   Substance and Sexual Activity   • Alcohol use: Yes     Alcohol/week: 1.0 standard drink of alcohol     Types: 1 Shots of liquor per week   • Drug use: Never   • Sexual activity: Yes     Partners: Male   Other Topics Concern   • None   Social History Narrative   • None     Social Determinants of Health     Financial Resource Strain: Low Risk  (2023)    Overall Financial Resource Strain (CARDIA)    • Difficulty of Paying Living Expenses: Not hard at all   Food Insecurity: No Food Insecurity (2024)    Hunger Vital Sign    • Worried About Running Out of Food in the Last Year: Never true    • Ran Out of Food in the Last Year: Never true   Transportation Needs: No Transportation Needs (2024)    PRAPARE - Transportation    • Lack of Transportation (Medical): No    • Lack of Transportation (Non-Medical): No   Physical Activity: Not on file   Stress: Not on file   Social Connections: Not on file   Intimate Partner Violence: Not on file   Housing Stability: Unknown (2024)    Housing Stability Vital Sign    • Unable to Pay for Housing in the Last Year: No    • Number of Places  Lived in the Last Year: Not on file    • Unstable Housing in the Last Year: No      Medications and Allergies:     Current Outpatient Medications   Medication Sig Dispense Refill   • fenofibrate 160 MG tablet TAKE 1 TABLET DAILY 90 tablet 0   • Fluticasone-Salmeterol (Advair Diskus) 100-50 mcg/dose inhaler Inhale 1 puff 2 (two) times a day Rinse mouth after use. 60 blister 0   • levothyroxine 200 mcg tablet TAKE 1 TABLET DAILY 90 tablet 0   • lisinopril-hydrochlorothiazide (PRINZIDE,ZESTORETIC) 10-12.5 MG per tablet TAKE 1 TABLET DAILY 90 tablet 1   • loratadine (CLARITIN) 10 mg tablet Take 10 mg by mouth daily     • pioglitazone-metFORMIN (ACTOPLUS MET)  MG per tablet TAKE 1 TABLET EVERY MORNING 90 tablet 1     No current facility-administered medications for this visit.     No Known Allergies   Immunizations:     Immunization History   Administered Date(s) Administered   • COVID-19 MODERNA VACC 0.25 ML IM BOOSTER 10/23/2021, 05/03/2022, 10/17/2022   • COVID-19 MODERNA VACC 0.5 ML IM 01/11/2021, 02/08/2021, 10/17/2022   • INFLUENZA 10/17/2014, 11/24/2015, 09/15/2016, 11/18/2017, 10/15/2018, 10/26/2018, 11/02/2021, 10/17/2022, 09/25/2023   • Influenza Injectable, MDCK, Preservative Free, Quadrivalent, 0.5 mL 09/14/2020   • Influenza, seasonal, injectable 11/14/2018, 12/09/2019   • Pneumococcal Conjugate Vaccine 20-valent (Pcv20), Polysace 02/13/2023   • Respiratory Syncytial Virus Vaccine (Recombinant) 11/27/2023   • Zoster Vaccine Recombinant 01/12/2022      Health Maintenance:         Topic Date Due   • Breast Cancer Screening: Mammogram  Never done   • Hepatitis C Screening  04/01/2030 (Originally 1955)   • Colorectal Cancer Screening  12/15/2031         Topic Date Due   • COVID-19 Vaccine (7 - 2023-24 season) 09/01/2023      Medicare Screening Tests and Risk Assessments:     Elodia is here for her Subsequent Wellness visit.     Health Risk Assessment:   Patient rates overall health as good. Patient  feels that their physical health rating is slightly better. Patient is very satisfied with their life. Eyesight was rated as same. Hearing was rated as same. Patient feels that their emotional and mental health rating is same. Patients states they are never, rarely angry. Patient states they are never, rarely unusually tired/fatigued. Pain experienced in the last 7 days has been some. Patient's pain rating has been 3/10. Patient states that she has experienced no weight loss or gain in last 6 months.     Depression Screening:   PHQ-2 Score: 0      Fall Risk Screening:   In the past year, patient has experienced: no history of falling in past year      Urinary Incontinence Screening:   Patient has not leaked urine accidently in the last six months.     Home Safety:  Patient does not have trouble with stairs inside or outside of their home. Patient has working smoke alarms and has working carbon monoxide detector. Home safety hazards include: none.     Nutrition:   Current diet is Regular and Low Carb.     Medications:   Patient is currently taking over-the-counter supplements. OTC medications include: see medication list. Patient is able to manage medications.     Activities of Daily Living (ADLs)/Instrumental Activities of Daily Living (IADLs):   Walk and transfer into and out of bed and chair?: Yes  Dress and groom yourself?: Yes    Bathe or shower yourself?: Yes    Feed yourself? Yes  Do your laundry/housekeeping?: Yes  Manage your money, pay your bills and track your expenses?: Yes  Make your own meals?: Yes    Do your own shopping?: Yes    Previous Hospitalizations:   Any hospitalizations or ED visits within the last 12 months?: No      Advance Care Planning:   Living will: Yes    Durable POA for healthcare: Yes    Advanced directive: Yes    End of Life Decisions reviewed with patient: Yes    Provider agrees with end of life decisions: Yes      Cognitive Screening:   Provider or family/friend/caregiver concerned  "regarding cognition?: No    PREVENTIVE SCREENINGS      Cardiovascular Screening:    General: Screening Not Indicated, History Lipid Disorder and Screening Current      Diabetes Screening:     General: Screening Not Indicated and History Diabetes      Colorectal Cancer Screening:     General: Screening Current      Cervical Cancer Screening:    General: Screening Not Indicated      Lung Cancer Screening:     General: Screening Not Indicated      Hepatitis C Screening:    General: Screening Current    Screening, Brief Intervention, and Referral to Treatment (SBIRT)    Screening      AUDIT-C Screenin) How often did you have a drink containing alcohol in the past year? monthly or less  2) How many drinks did you have on a typical day when you were drinking in the past year? 3 to 4  3) How often did you have 6 or more drinks on one occasion in the past year? never    AUDIT-C Score: 1  Interpretation: Score 0-2 (female): Negative screen for alcohol misuse    Single Item Drug Screening:  How often have you used an illegal drug (including marijuana) or a prescription medication for non-medical reasons in the past year? never    Single Item Drug Screen Score: 0  Interpretation: Negative screen for possible drug use disorder    No results found.     Physical Exam:     /68   Pulse 92   Temp 97.5 °F (36.4 °C) (Temporal)   Ht 5' 6\" (1.676 m)   Wt 92.8 kg (204 lb 9.6 oz)   SpO2 97%   BMI 33.02 kg/m²     Physical Exam  Vitals and nursing note reviewed.   Constitutional:       General: She is not in acute distress.     Appearance: Normal appearance. She is well-developed.      Comments: 68-year-old female who appears stated age   HENT:      Head: Normocephalic and atraumatic.      Right Ear: Tympanic membrane normal.      Left Ear: Tympanic membrane normal.      Nose: Nose normal.      Mouth/Throat:      Mouth: Mucous membranes are moist.   Eyes:      Conjunctiva/sclera: Conjunctivae normal.   Neck:      Vascular: " No carotid bruit.   Cardiovascular:      Rate and Rhythm: Normal rate and regular rhythm.      Heart sounds: No murmur heard.  Pulmonary:      Effort: Pulmonary effort is normal. No respiratory distress.      Breath sounds: Normal breath sounds.   Abdominal:      Palpations: Abdomen is soft.      Tenderness: There is no abdominal tenderness.   Musculoskeletal:         General: No swelling.      Cervical back: Neck supple.   Skin:     Findings: No rash.   Neurological:      Mental Status: She is alert and oriented to person, place, and time.   Psychiatric:         Mood and Affect: Mood normal.         Behavior: Behavior normal.         Thought Content: Thought content normal.         Judgment: Judgment normal.          Mylene Hua,

## 2024-04-05 NOTE — PATIENT INSTRUCTIONS
Medicare Preventive Visit Patient Instructions  Thank you for completing your Welcome to Medicare Visit or Medicare Annual Wellness Visit today. Your next wellness visit will be due in one year (4/6/2025).  The screening/preventive services that you may require over the next 5-10 years are detailed below. Some tests may not apply to you based off risk factors and/or age. Screening tests ordered at today's visit but not completed yet may show as past due. Also, please note that scanned in results may not display below.  Preventive Screenings:  Service Recommendations Previous Testing/Comments   Colorectal Cancer Screening  * Colonoscopy    * Fecal Occult Blood Test (FOBT)/Fecal Immunochemical Test (FIT)  * Fecal DNA/Cologuard Test  * Flexible Sigmoidoscopy Age: 45-75 years old   Colonoscopy: every 10 years (may be performed more frequently if at higher risk)  OR  FOBT/FIT: every 1 year  OR  Cologuard: every 3 years  OR  Sigmoidoscopy: every 5 years  Screening may be recommended earlier than age 45 if at higher risk for colorectal cancer. Also, an individualized decision between you and your healthcare provider will decide whether screening between the ages of 76-85 would be appropriate. Colonoscopy: 12/17/2021  FOBT/FIT: Not on file  Cologuard: Not on file  Sigmoidoscopy: Not on file          Breast Cancer Screening Age: 40+ years old  Frequency: every 1-2 years  Not required if history of left and right mastectomy Mammogram: Not on file        Cervical Cancer Screening Between the ages of 21-29, pap smear recommended once every 3 years.   Between the ages of 30-65, can perform pap smear with HPV co-testing every 5 years.   Recommendations may differ for women with a history of total hysterectomy, cervical cancer, or abnormal pap smears in past. Pap Smear: 04/13/2022        Hepatitis C Screening Once for adults born between 1945 and 1965  More frequently in patients at high risk for Hepatitis C Hep C Antibody: Not on  file        Diabetes Screening 1-2 times per year if you're at risk for diabetes or have pre-diabetes Fasting glucose: No results in last 5 years (No results in last 5 years)  A1C: 7.4 % (11/25/2023)      Cholesterol Screening Once every 5 years if you don't have a lipid disorder. May order more often based on risk factors. Lipid panel: 04/21/2021          Other Preventive Screenings Covered by Medicare:  Abdominal Aortic Aneurysm (AAA) Screening: covered once if your at risk. You're considered to be at risk if you have a family history of AAA.  Lung Cancer Screening: covers low dose CT scan once per year if you meet all of the following conditions: (1) Age 55-77; (2) No signs or symptoms of lung cancer; (3) Current smoker or have quit smoking within the last 15 years; (4) You have a tobacco smoking history of at least 20 pack years (packs per day multiplied by number of years you smoked); (5) You get a written order from a healthcare provider.  Glaucoma Screening: covered annually if you're considered high risk: (1) You have diabetes OR (2) Family history of glaucoma OR (3)  aged 50 and older OR (4)  American aged 65 and older  Osteoporosis Screening: covered every 2 years if you meet one of the following conditions: (1) You're estrogen deficient and at risk for osteoporosis based off medical history and other findings; (2) Have a vertebral abnormality; (3) On glucocorticoid therapy for more than 3 months; (4) Have primary hyperparathyroidism; (5) On osteoporosis medications and need to assess response to drug therapy.   Last bone density test (DXA Scan): Not on file.  HIV Screening: covered annually if you're between the age of 15-65. Also covered annually if you are younger than 15 and older than 65 with risk factors for HIV infection. For pregnant patients, it is covered up to 3 times per pregnancy.    Immunizations:  Immunization Recommendations   Influenza Vaccine Annual influenza  vaccination during flu season is recommended for all persons aged >= 6 months who do not have contraindications   Pneumococcal Vaccine   * Pneumococcal conjugate vaccine = PCV13 (Prevnar 13), PCV15 (Vaxneuvance), PCV20 (Prevnar 20)  * Pneumococcal polysaccharide vaccine = PPSV23 (Pneumovax) Adults 19-65 yo with certain risk factors or if 65+ yo  If never received any pneumonia vaccine: recommend Prevnar 20 (PCV20)  Give PCV20 if previously received 1 dose of PCV13 or PPSV23   Hepatitis B Vaccine 3 dose series if at intermediate or high risk (ex: diabetes, end stage renal disease, liver disease)   Respiratory syncytial virus (RSV) Vaccine - COVERED BY MEDICARE PART D  * RSVPreF3 (Arexvy) CDC recommends that adults 60 years of age and older may receive a single dose of RSV vaccine using shared clinical decision-making (SCDM)   Tetanus (Td) Vaccine - COST NOT COVERED BY MEDICARE PART B Following completion of primary series, a booster dose should be given every 10 years to maintain immunity against tetanus. Td may also be given as tetanus wound prophylaxis.   Tdap Vaccine - COST NOT COVERED BY MEDICARE PART B Recommended at least once for all adults. For pregnant patients, recommended with each pregnancy.   Shingles Vaccine (Shingrix) - COST NOT COVERED BY MEDICARE PART B  2 shot series recommended in those 19 years and older who have or will have weakened immune systems or those 50 years and older     Health Maintenance Due:      Topic Date Due   • Breast Cancer Screening: Mammogram  Never done   • Hepatitis C Screening  04/01/2030 (Originally 1955)   • Colorectal Cancer Screening  12/15/2031     Immunizations Due:      Topic Date Due   • COVID-19 Vaccine (7 - 2023-24 season) 09/01/2023     Advance Directives   What are advance directives?  Advance directives are legal documents that state your wishes and plans for medical care. These plans are made ahead of time in case you lose your ability to make decisions  for yourself. Advance directives can apply to any medical decision, such as the treatments you want, and if you want to donate organs.   What are the types of advance directives?  There are many types of advance directives, and each state has rules about how to use them. You may choose a combination of any of the following:  Living will:  This is a written record of the treatment you want. You can also choose which treatments you do not want, which to limit, and which to stop at a certain time. This includes surgery, medicine, IV fluid, and tube feedings.   Durable power of  for healthcare (DPAHC):  This is a written record that states who you want to make healthcare choices for you when you are unable to make them for yourself. This person, called a proxy, is usually a family member or a friend. You may choose more than 1 proxy.  Do not resuscitate (DNR) order:  A DNR order is used in case your heart stops beating or you stop breathing. It is a request not to have certain forms of treatment, such as CPR. A DNR order may be included in other types of advance directives.  Medical directive:  This covers the care that you want if you are in a coma, near death, or unable to make decisions for yourself. You can list the treatments you want for each condition. Treatment may include pain medicine, surgery, blood transfusions, dialysis, IV or tube feedings, and a ventilator (breathing machine).  Values history:  This document has questions about your views, beliefs, and how you feel and think about life. This information can help others choose the care that you would choose.  Why are advance directives important?  An advance directive helps you control your care. Although spoken wishes may be used, it is better to have your wishes written down. Spoken wishes can be misunderstood, or not followed. Treatments may be given even if you do not want them. An advance directive may make it easier for your family to make  difficult choices about your care.   Weight Management   Why it is important to manage your weight:  Being overweight increases your risk of health conditions such as heart disease, high blood pressure, type 2 diabetes, and certain types of cancer. It can also increase your risk for osteoarthritis, sleep apnea, and other respiratory problems. Aim for a slow, steady weight loss. Even a small amount of weight loss can lower your risk of health problems.  How to lose weight safely:  A safe and healthy way to lose weight is to eat fewer calories and get regular exercise. You can lose up about 1 pound a week by decreasing the number of calories you eat by 500 calories each day.   Healthy meal plan for weight management:  A healthy meal plan includes a variety of foods, contains fewer calories, and helps you stay healthy. A healthy meal plan includes the following:  Eat whole-grain foods more often.  A healthy meal plan should contain fiber. Fiber is the part of grains, fruits, and vegetables that is not broken down by your body. Whole-grain foods are healthy and provide extra fiber in your diet. Some examples of whole-grain foods are whole-wheat breads and pastas, oatmeal, brown rice, and bulgur.  Eat a variety of vegetables every day.  Include dark, leafy greens such as spinach, kale, denton greens, and mustard greens. Eat yellow and orange vegetables such as carrots, sweet potatoes, and winter squash.   Eat a variety of fruits every day.  Choose fresh or canned fruit (canned in its own juice or light syrup) instead of juice. Fruit juice has very little or no fiber.  Eat low-fat dairy foods.  Drink fat-free (skim) milk or 1% milk. Eat fat-free yogurt and low-fat cottage cheese. Try low-fat cheeses such as mozzarella and other reduced-fat cheeses.  Choose meat and other protein foods that are low in fat.  Choose beans or other legumes such as split peas or lentils. Choose fish, skinless poultry (chicken or turkey), or  lean cuts of red meat (beef or pork). Before you cook meat or poultry, cut off any visible fat.   Use less fat and oil.  Try baking foods instead of frying them. Add less fat, such as margarine, sour cream, regular salad dressing and mayonnaise to foods. Eat fewer high-fat foods. Some examples of high-fat foods include french fries, doughnuts, ice cream, and cakes.  Eat fewer sweets.  Limit foods and drinks that are high in sugar. This includes candy, cookies, regular soda, and sweetened drinks.  Exercise:  Exercise at least 30 minutes per day on most days of the week. Some examples of exercise include walking, biking, dancing, and swimming. You can also fit in more physical activity by taking the stairs instead of the elevator or parking farther away from stores. Ask your healthcare provider about the best exercise plan for you.      © Copyright [employer] 2018 Information is for End User's use only and may not be sold, redistributed or otherwise used for commercial purposes. All illustrations and images included in CareNotes® are the copyrighted property of A.D.A.M., Inc. or [employer]

## 2024-04-08 ENCOUNTER — TELEPHONE (OUTPATIENT)
Dept: ADMINISTRATIVE | Facility: OTHER | Age: 69
End: 2024-04-08

## 2024-04-08 NOTE — LETTER
Diabetic Eye Exam Form    Date Requested: 24  Patient: Elodia Hightower  Patient : 1955   Referring Provider: Mylene Hua DO      DIABETIC Eye Exam Date _______________________________      Type of Exam MUST be documented for Diabetic Eye Exams. Please CHECK ONE.     Retinal Exam       Dilated Retinal Exam       OCT       Optomap-Iris Exam      Fundus Photography       Left Eye - Please check Retinopathy or No Retinopathy        Exam did show retinopathy    Exam did not show retinopathy       Right Eye - Please check Retinopathy or No Retinopathy       Exam did show retinopathy    Exam did not show retinopathy       Comments __________________________________________________________    Practice Providing Exam ______________________________________________    Exam Performed By (print name) _______________________________________      Provider Signature ___________________________________________________      These reports are needed for  compliance.  Please fax this completed form and a copy of the Diabetic Eye Exam report to our office located at [address] [place] [address] as soon as possible via Fax [phone] attention Maddi: Phone [phone]  We thank you for your assistance in treating our mutual patient.

## 2024-04-08 NOTE — TELEPHONE ENCOUNTER
----- Message from Mylene Hua DO sent at 4/5/2024  2:38 PM EDT -----  Regarding: care gap request  04/05/24 2:38 PM    Hello, our patient attached above has had Diabetic Eye Exam completed/performed. Please assist in updating the patient chart by making an External outreach to [facility] facility located in [place]. The date of service is 2024.    Thank you,  Mylene Hua [facility]

## 2024-04-08 NOTE — LETTER
Diabetic Eye Exam Form    Date Requested: 24  Patient: Elodia Hightower  Patient : 1955   Referring Provider: Mylene Hua DO      DIABETIC Eye Exam Date _______________________________      Type of Exam MUST be documented for Diabetic Eye Exams. Please CHECK ONE.     Retinal Exam       Dilated Retinal Exam       OCT       Optomap-Iris Exam      Fundus Photography       Left Eye - Please check Retinopathy or No Retinopathy        Exam did show retinopathy    Exam did not show retinopathy       Right Eye - Please check Retinopathy or No Retinopathy       Exam did show retinopathy    Exam did not show retinopathy       Comments __________________________________________________________    Practice Providing Exam ______________________________________________    Exam Performed By (print name) _______________________________________      Provider Signature ___________________________________________________      These reports are needed for  compliance.  Please fax this completed form and a copy of the Diabetic Eye Exam report to our office located at [address] [place] [address] as soon as possible via Fax [phone] attention Maddi: Phone [phone]  We thank you for your assistance in treating our mutual patient.

## 2024-05-01 ENCOUNTER — HOSPITAL ENCOUNTER (OUTPATIENT)
Dept: MAMMOGRAPHY | Facility: MEDICAL CENTER | Age: 69
Discharge: HOME | End: 2024-05-01
Payer: COMMERCIAL

## 2024-05-01 VITALS — HEIGHT: 66 IN | BODY MASS INDEX: 33.02 KG/M2

## 2024-05-01 DIAGNOSIS — Z12.31 ENCOUNTER FOR SCREENING MAMMOGRAM FOR BREAST CANCER: ICD-10-CM

## 2024-05-01 PROCEDURE — 77063 BREAST TOMOSYNTHESIS BI: CPT

## 2024-05-01 PROCEDURE — 77067 SCR MAMMO BI INCL CAD: CPT

## 2024-05-07 ENCOUNTER — VBI (OUTPATIENT)
Dept: ADMINISTRATIVE | Facility: OTHER | Age: 69
End: 2024-05-07

## 2024-08-16 DIAGNOSIS — I10 PRIMARY HYPERTENSION: ICD-10-CM

## 2024-08-16 RX ORDER — LISINOPRIL/HYDROCHLOROTHIAZIDE 10-12.5 MG
TABLET ORAL
Qty: 90 TABLET | Refills: 1 | Status: SHIPPED | OUTPATIENT
Start: 2024-08-16

## 2024-10-07 ENCOUNTER — RA CDI HCC (OUTPATIENT)
Dept: OTHER | Facility: HOSPITAL | Age: 69
End: 2024-10-07

## 2024-10-08 LAB
CHOLEST SERPL-MCNC: 208 MG/DL
CHOLEST/HDLC SERPL: 3.4 {RATIO}
HDLC SERPL-MCNC: 61 MG/DL (ref 23–92)
LDLC SERPL CALC-MCNC: 114 MG/DL
NONHDLC SERPL-MCNC: 147 MG/DL
TRIGL SERPL-MCNC: 165 MG/DL
TSH SERPL-ACNC: 0.41 UIU/ML (ref 0.45–5.33)

## 2024-10-11 ENCOUNTER — TELEPHONE (OUTPATIENT)
Dept: ADMINISTRATIVE | Facility: OTHER | Age: 69
End: 2024-10-11

## 2024-10-11 ENCOUNTER — OFFICE VISIT (OUTPATIENT)
Dept: FAMILY MEDICINE CLINIC | Facility: CLINIC | Age: 69
End: 2024-10-11
Payer: COMMERCIAL

## 2024-10-11 VITALS
RESPIRATION RATE: 16 BRPM | DIASTOLIC BLOOD PRESSURE: 78 MMHG | SYSTOLIC BLOOD PRESSURE: 132 MMHG | HEIGHT: 66 IN | OXYGEN SATURATION: 98 % | HEART RATE: 70 BPM | TEMPERATURE: 97.7 F | BODY MASS INDEX: 32.3 KG/M2 | WEIGHT: 201 LBS

## 2024-10-11 DIAGNOSIS — E11.65 TYPE 2 DIABETES MELLITUS WITH HYPERGLYCEMIA, WITHOUT LONG-TERM CURRENT USE OF INSULIN (HCC): Primary | ICD-10-CM

## 2024-10-11 DIAGNOSIS — Z23 ENCOUNTER FOR IMMUNIZATION: ICD-10-CM

## 2024-10-11 DIAGNOSIS — E03.9 ACQUIRED HYPOTHYROIDISM: ICD-10-CM

## 2024-10-11 DIAGNOSIS — I10 PRIMARY HYPERTENSION: ICD-10-CM

## 2024-10-11 DIAGNOSIS — Z78.0 ENCOUNTER FOR OSTEOPOROSIS SCREENING IN ASYMPTOMATIC POSTMENOPAUSAL PATIENT: ICD-10-CM

## 2024-10-11 DIAGNOSIS — Z13.820 ENCOUNTER FOR OSTEOPOROSIS SCREENING IN ASYMPTOMATIC POSTMENOPAUSAL PATIENT: ICD-10-CM

## 2024-10-11 LAB — SL AMB POCT HEMOGLOBIN AIC: 7.3 (ref ?–6.5)

## 2024-10-11 PROCEDURE — 83036 HEMOGLOBIN GLYCOSYLATED A1C: CPT | Performed by: FAMILY MEDICINE

## 2024-10-11 PROCEDURE — 99214 OFFICE O/P EST MOD 30 MIN: CPT | Performed by: FAMILY MEDICINE

## 2024-10-11 PROCEDURE — G0008 ADMIN INFLUENZA VIRUS VAC: HCPCS

## 2024-10-11 PROCEDURE — 90662 IIV NO PRSV INCREASED AG IM: CPT

## 2024-10-11 NOTE — PROGRESS NOTES
Ambulatory Visit  Name: Elodia Hightower      : 1955      MRN: [id number]  Encounter Provider: Mylene Hua DO  Encounter Date: 10/11/2024   Encounter department: [facility]    Assessment & Plan  Type 2 diabetes mellitus with hyperglycemia, without long-term current use of insulin (HCC)  Advised to update diabetic eye checkup with Dr. Ferrell prior to his [facility].  Patient's exam is due  Lab Results   Component Value Date    HGBA1C 7.3 (A) 10/11/2024       Orders:    POCT hemoglobin A1c    Basic metabolic panel; Future    Hemoglobin A1C; Future    Albumin / creatinine urine ratio; Future    Hemoglobin A1C    Albumin / creatinine urine ratio    Encounter for osteoporosis screening in asymptomatic postmenopausal patient    Orders:    DXA bone density spine hip and pelvis; Future    Primary hypertension  Continue current medical regimen and fitness efforts       Acquired hypothyroidism  Continue current thyroid dosing  Orders:    TSH, 3rd generation; Future    TSH, 3rd generation    Encounter for immunization    Orders:    influenza vaccine, high-dose, PF 0.5 mL (Fluzone High Dose)       History of Present Illness   Chief Complaint   Patient presents with    Follow-up     6 month follow up       69-year-old female here for diabetic follow-up, continued improvement of hemoglobin A1c stabilization.      Component      Latest Ref Rng 2023 2024 10/11/2024   Hemoglobin A1C      6.5  7.4 (E) 7.3 !  7.3 !       Component      Latest Ref Rng 10/8/2024   Cholesterol      <200 mg/dL 208 (H)    Triglycerides      <150 mg/dL 165 (H)    HDL CHOLESTEROL LIPOPROTEIN      23 - 92 mg/dL 61    NON HDL CHOL. (LDL+VLDL)      <160 mg/dL 147    LDL Calculated      <130 mg/dL 114    Chol/HDL Ratio 3.4    TSH, POC      0.45 - 5.33 uIU/mL 0.41 (L)       TSH is close to normal so no changes as patient is not having any supratherapeutic signs or symptoms.  Strength and walking exercise    Review  "of Systems        Objective     /78   Pulse 70   Temp 97.7 °F (36.5 °C) (Temporal)   Resp 16   Ht 5' 6\" (1.676 m)   Wt 91.2 kg (201 lb)   SpO2 98%   BMI 32.44 kg/m²     Physical Exam  Constitutional:       General: She is not in acute distress.     Appearance: Normal appearance. She is well-developed.      Comments: 69-year-old female appears younger than stated age   HENT:      Head: Normocephalic.      Right Ear: Tympanic membrane normal.      Left Ear: Tympanic membrane normal.      Nose: Nose normal.      Mouth/Throat:      Mouth: Mucous membranes are moist.   Eyes:      Conjunctiva/sclera: Conjunctivae normal.      Pupils: Pupils are equal, round, and reactive to light.   Neck:      Vascular: No carotid bruit.   Cardiovascular:      Rate and Rhythm: Normal rate and regular rhythm.      Pulses: Normal pulses.      Heart sounds: No murmur heard.  Pulmonary:      Effort: Pulmonary effort is normal.      Breath sounds: Normal breath sounds.   Abdominal:      General: Bowel sounds are normal.      Palpations: Abdomen is soft. There is no mass.      Tenderness: There is no abdominal tenderness.   Musculoskeletal:         General: Normal range of motion.   Skin:     Findings: No rash.   Neurological:      Mental Status: She is alert and oriented to person, place, and time.      Deep Tendon Reflexes: Reflexes are normal and symmetric.   Psychiatric:         Mood and Affect: Mood normal.         Behavior: Behavior normal.         Thought Content: Thought content normal.         Judgment: Judgment normal.         "

## 2024-10-11 NOTE — TELEPHONE ENCOUNTER
----- Message from Mylene Hua DO sent at 10/11/2024 11:21 AM EDT -----  Regarding: care gap request  10/11/24 11:21 AM    Hello, our patient attached above has had Diabetic Eye Exam completed/performed. Please assist in updating the patient chart by making an External outreach to Dr Ferrell facility located in [place]. The date of service is 2023.    Thank you,  Mylene Hua  [facility]

## 2024-10-11 NOTE — TELEPHONE ENCOUNTER
Upon review of the In Basket request and the patient's chart, initial outreach has been made via fax to facility. Please see Contacts section for details.     Thank you  Valerie Osborn

## 2024-10-11 NOTE — ASSESSMENT & PLAN NOTE
Advised to update diabetic eye checkup with Dr. Ferrell prior to his [facility].  Patient's exam is due  Lab Results   Component Value Date    HGBA1C 7.3 (A) 10/11/2024       Orders:    POCT hemoglobin A1c    Basic metabolic panel; Future    Hemoglobin A1C; Future    Albumin / creatinine urine ratio; Future    Hemoglobin A1C    Albumin / creatinine urine ratio

## 2024-10-11 NOTE — LETTER
Diabetic Eye Exam Form    Date Requested: 10/11/24  Patient: Elodia Hightower  Patient : 1955   Referring Provider: Mylene Hua DO      DIABETIC Eye Exam Date _______________________________      Type of Exam MUST be documented for Diabetic Eye Exams. Please CHECK ONE.     Retinal Exam       Dilated Retinal Exam       OCT       Optomap-Iris Exam      Fundus Photography       Left Eye - Please check Retinopathy or No Retinopathy        Exam did show retinopathy    Exam did not show retinopathy       Right Eye - Please check Retinopathy or No Retinopathy       Exam did show retinopathy    Exam did not show retinopathy       Comments __________________________________________________________    Practice Providing Exam ______________________________________________    Exam Performed By (print name) _______________________________________      Provider Signature ___________________________________________________      These reports are needed for  compliance.  Please fax this completed form and a copy of the Diabetic Eye Exam report to our office located at [address] [place] [address] as soon as possible via Fax [phone] attention Valerie: Phone [phone]  We thank you for your assistance in treating our mutual patient.

## 2024-10-14 NOTE — TELEPHONE ENCOUNTER
As a follow-up, a second attempt has been made for outreach via telephone call to facility. Please see Contacts section for details.    Patient last seen January 2023, which is updated in the patients chart     Thank you  Valerie Osborn

## 2024-10-16 ENCOUNTER — VBI (OUTPATIENT)
Dept: ADMINISTRATIVE | Facility: OTHER | Age: 69
End: 2024-10-16

## 2024-10-16 NOTE — TELEPHONE ENCOUNTER
10/16/24 3:26 PM     Chart reviewed for CRC: Colonoscopy was/were not submitted to the patient's insurance.     Hansa Miranda MA   [facility]

## 2024-10-30 DIAGNOSIS — E03.9 ACQUIRED HYPOTHYROIDISM: ICD-10-CM

## 2024-10-30 RX ORDER — LEVOTHYROXINE SODIUM 200 UG/1
TABLET ORAL
Qty: 90 TABLET | Refills: 1 | Status: SHIPPED | OUTPATIENT
Start: 2024-10-30

## 2024-12-05 DIAGNOSIS — J45.20 MILD INTERMITTENT ASTHMA WITHOUT COMPLICATION: Primary | ICD-10-CM

## 2024-12-05 RX ORDER — ALBUTEROL SULFATE 90 UG/1
2 INHALANT RESPIRATORY (INHALATION) EVERY 6 HOURS PRN
Qty: 18 G | Refills: 1 | Status: SHIPPED | OUTPATIENT
Start: 2024-12-05

## 2024-12-05 RX ORDER — FLUTICASONE PROPIONATE AND SALMETEROL 100; 50 UG/1; UG/1
1 POWDER RESPIRATORY (INHALATION) 2 TIMES DAILY
Qty: 60 BLISTER | Refills: 1 | Status: SHIPPED | OUTPATIENT
Start: 2024-12-05 | End: 2025-06-03

## 2024-12-11 DIAGNOSIS — E78.2 MODERATE MIXED HYPERLIPIDEMIA NOT REQUIRING STATIN THERAPY: ICD-10-CM

## 2024-12-12 RX ORDER — FENOFIBRATE 160 MG/1
160 TABLET ORAL DAILY
Qty: 90 TABLET | Refills: 1 | Status: SHIPPED | OUTPATIENT
Start: 2024-12-12

## 2024-12-20 DIAGNOSIS — E11.9 TYPE 2 DIABETES MELLITUS WITHOUT COMPLICATION, WITHOUT LONG-TERM CURRENT USE OF INSULIN (HCC): ICD-10-CM

## 2024-12-20 RX ORDER — PIOGLITAZONE HCL AND METFORMIN HCL 500; 15 MG/1; MG/1
1 TABLET ORAL EVERY MORNING
Qty: 90 TABLET | Refills: 1 | Status: SHIPPED | OUTPATIENT
Start: 2024-12-20

## 2025-02-12 DIAGNOSIS — I10 PRIMARY HYPERTENSION: ICD-10-CM

## 2025-02-12 RX ORDER — LISINOPRIL AND HYDROCHLOROTHIAZIDE 10; 12.5 MG/1; MG/1
1 TABLET ORAL DAILY
Qty: 90 TABLET | Refills: 0 | Status: SHIPPED | OUTPATIENT
Start: 2025-02-12

## 2025-04-28 DIAGNOSIS — E03.9 ACQUIRED HYPOTHYROIDISM: ICD-10-CM

## 2025-04-28 RX ORDER — LEVOTHYROXINE SODIUM 200 UG/1
200 TABLET ORAL DAILY
Qty: 90 TABLET | Refills: 1 | Status: SHIPPED | OUTPATIENT
Start: 2025-04-28 | End: 2025-05-02 | Stop reason: SDUPTHER

## 2025-04-29 LAB
ALBUMIN/CREAT UR: NORMAL
ANION GAP SERPL CALCULATED.3IONS-SCNC: 7 MMOL/L (ref 3–11)
BUN SERPL-MCNC: 25 MG/DL (ref 7–25)
CALCIUM SERPL-MCNC: 9.9 MG/DL (ref 8.5–10.5)
CHLORIDE SERPL-SCNC: 103 MMOL/L (ref 100–109)
CO2 SERPL-SCNC: 29 MMOL/L (ref 21–31)
CREAT SERPL-MCNC: 0.68 MG/DL (ref 0.4–1.1)
CREAT UR-MCNC: 84.6 MG/DL (ref 50–200)
CYTOLOGY CMNT CVX/VAG CYTO-IMP: ABNORMAL
EST. AVERAGE GLUCOSE BLD GHB EST-MCNC: 180 MG/DL
GFR/BSA.PRED SERPLBLD CYS-BASED-ARV: 95 ML/MIN/{1.73_M2}
GLUCOSE SERPL-MCNC: 181 MG/DL (ref 65–99)
HBA1C MFR BLD: 7.9 %
MICROALBUMIN UR-MCNC: 0.7 MG/DL
POTASSIUM SERPL-SCNC: 4.5 MMOL/L (ref 3.5–5.2)
SODIUM SERPL-SCNC: 139 MMOL/L (ref 135–145)
TSH SERPL-ACNC: 0.05 UIU/ML (ref 0.45–5.33)

## 2025-04-30 ENCOUNTER — RA CDI HCC (OUTPATIENT)
Dept: OTHER | Facility: HOSPITAL | Age: 70
End: 2025-04-30

## 2025-05-02 ENCOUNTER — OFFICE VISIT (OUTPATIENT)
Dept: FAMILY MEDICINE CLINIC | Facility: CLINIC | Age: 70
End: 2025-05-02
Payer: MEDICARE

## 2025-05-02 VITALS
WEIGHT: 199.6 LBS | HEIGHT: 66 IN | DIASTOLIC BLOOD PRESSURE: 70 MMHG | BODY MASS INDEX: 32.08 KG/M2 | SYSTOLIC BLOOD PRESSURE: 130 MMHG | TEMPERATURE: 97.5 F | HEART RATE: 81 BPM | OXYGEN SATURATION: 97 %

## 2025-05-02 DIAGNOSIS — E11.65 TYPE 2 DIABETES MELLITUS WITH HYPERGLYCEMIA, WITHOUT LONG-TERM CURRENT USE OF INSULIN (HCC): ICD-10-CM

## 2025-05-02 DIAGNOSIS — E78.00 PURE HYPERCHOLESTEROLEMIA: ICD-10-CM

## 2025-05-02 DIAGNOSIS — I10 PRIMARY HYPERTENSION: ICD-10-CM

## 2025-05-02 DIAGNOSIS — E03.9 ACQUIRED HYPOTHYROIDISM: ICD-10-CM

## 2025-05-02 DIAGNOSIS — Z00.00 ENCOUNTER FOR MEDICARE ANNUAL WELLNESS EXAM: Primary | ICD-10-CM

## 2025-05-02 DIAGNOSIS — F40.10 SOCIAL PHOBIA: ICD-10-CM

## 2025-05-02 DIAGNOSIS — Z12.31 ENCOUNTER FOR SCREENING MAMMOGRAM FOR BREAST CANCER: ICD-10-CM

## 2025-05-02 LAB
LEFT EYE DIABETIC RETINOPATHY: NORMAL
LEFT EYE IMAGE QUALITY: NORMAL
LEFT EYE MACULAR EDEMA: NORMAL
LEFT EYE OTHER RETINOPATHY: NORMAL
RIGHT EYE DIABETIC RETINOPATHY: NORMAL
RIGHT EYE IMAGE QUALITY: NORMAL
RIGHT EYE MACULAR EDEMA: NORMAL
RIGHT EYE OTHER RETINOPATHY: NORMAL
SEVERITY (EYE EXAM): NORMAL

## 2025-05-02 PROCEDURE — G0439 PPPS, SUBSEQ VISIT: HCPCS | Performed by: FAMILY MEDICINE

## 2025-05-02 PROCEDURE — 92250 FUNDUS PHOTOGRAPHY W/I&R: CPT | Performed by: FAMILY MEDICINE

## 2025-05-02 PROCEDURE — G2211 COMPLEX E/M VISIT ADD ON: HCPCS | Performed by: FAMILY MEDICINE

## 2025-05-02 PROCEDURE — 99213 OFFICE O/P EST LOW 20 MIN: CPT | Performed by: FAMILY MEDICINE

## 2025-05-02 RX ORDER — LEVOTHYROXINE SODIUM 150 UG/1
150 TABLET ORAL DAILY
Qty: 90 TABLET | Refills: 0 | Status: SHIPPED | OUTPATIENT
Start: 2025-05-02

## 2025-05-02 RX ORDER — LORAZEPAM 0.5 MG/1
0.5 TABLET ORAL EVERY 8 HOURS PRN
Qty: 10 TABLET | Refills: 0 | Status: SHIPPED | OUTPATIENT
Start: 2025-05-02

## 2025-05-02 NOTE — PROGRESS NOTES
Diabetic Foot Exam    Patient's shoes and socks removed.    Right Foot/Ankle   Right Foot Inspection  Skin Exam: skin normal, skin intact and erythema. No dry skin, no warmth, no callus, no maceration, no abnormal color, no pre-ulcer, no ulcer and no callus.     Toe Exam: ROM and strength within normal limits and erythema.     Sensory   Monofilament testing: intact    Vascular  Capillary refills: < 3 seconds  The right DP pulse is 2+. The right PT pulse is 1+.     Right Toe  - Comprehensive Exam  Arch: normal  Hammertoes: absent  Swelling: none       Left Foot/Ankle  Left Foot Inspection  Skin Exam: skin normal and skin intact. No dry skin, no warmth, no erythema, no maceration, normal color, no pre-ulcer, no ulcer and no callus.     Toe Exam: ROM and strength within normal limits.     Sensory   Monofilament testing: intact    Vascular  Capillary refills: < 3 seconds  The left DP pulse is 2+. The left PT pulse is 1+.     Left Toe  - Comprehensive Exam  Arch: normal  Hammertoes: absent  Swelling: none       Assign Risk Category  No deformity present  No loss of protective sensation  No weak pulses  Risk: 0  Name: Elodia Hightower      : 1955      MRN: [id number]  Encounter Provider: Mylene Hua DO  Encounter Date: 2025   Encounter department: [facility]  :  Assessment & Plan  Encounter for Medicare annual wellness exam         Type 2 diabetes mellitus with hyperglycemia, without long-term current use of insulin ([facility])    Lab Results   Component Value Date    HGBA1C 7.9 (H) 2025   Patient is continued on her excellent efforts at dietary and fitness and weight loss.She will continue on Actos plus met    Orders:  •  IRIS Diabetic eye exam    Primary hypertension  At goal continue current med       Acquired hypothyroidism  Patient's labs are supratherapeutic.  Decrease from 200 mcg down to 150 mcg.  Recheck TSH and T4 in 3 months.  Orders:  •  levothyroxine 150 mcg tablet;  Take 1 tablet (150 mcg total) by mouth daily  •  TSH, 3rd generation; Future  •  T4, free; Future    Encounter for screening mammogram for breast cancer    Orders:  •  Mammo screening bilateral w 3d and cad; Future    Pure hypercholesterolemia  Labs in October, continue on fenofibrate  Orders:  •  Lipid panel; Future    Social phobia    Orders:  •  LORazepam (Ativan) 0.5 mg tablet; Take 1 tablet (0.5 mg total) by mouth every 8 (eight) hours as needed for anxiety      Depression Screening and Follow-up Plan: Patient was screened for depression during today's encounter. They screened negative with a PHQ-2 score of 0.      Return in about 6 months (around 11/2/2025) for with nurse practitioner Kendall Perez.   Preventive health issues were discussed with patient, and age appropriate screening tests were ordered as noted in patient's After Visit Summary. Personalized health advice and appropriate referrals for health education or preventive services given if needed, as noted in patient's After Visit Summary.    History of Present Illness     Elodia is a 69-year-old female here for Medicare wellness and review diabetes and other medical diagnoses.  (Diabetes, hypertension, thyroid    Labs reviewed.  Patient is active.  Walking 6 days a week down 10 pounds.  Disappointed with her hemoglobin A1c.     Review follow-up.  Goal: 80-year follow-up Patient Care Team:  Mylene Hua DO as PCP - General (Family Medicine)    Review of Systems   Constitutional:  Negative for activity change (walking 6 days a week) and unexpected weight change (down 10 #).   Respiratory:  Negative for shortness of breath.    Cardiovascular:  Negative for chest pain.   Neurological: Negative.    Psychiatric/Behavioral: Negative.       Annual Wellness Visit Questionnaire   Elodia is here for her Subsequent Wellness visit.     Health Risk Assessment:   Patient rates overall health as very good. Patient feels that their physical health rating is  slightly better. Patient is very satisfied with their life. Eyesight was rated as same. Hearing was rated as same. Patient feels that their emotional and mental health rating is same. Patients states they are never, rarely angry. Patient states they are never, rarely unusually tired/fatigued. Pain experienced in the last 7 days has been none.     Depression Screening:   PHQ-2 Score: 0      Fall Risk Screening:   In the past year, patient has experienced: history of falling in past year    Number of falls: 1  Injured during fall?: No    Feels unsteady when standing or walking?: No    Worried about falling?: No      Urinary Incontinence Screening:   Patient has not leaked urine accidently in the last six months.     Home Safety:  Patient does not have trouble with stairs inside or outside of their home. Patient has working smoke alarms and has working carbon monoxide detector. Home safety hazards include: none.     Nutrition:   Current diet is Regular, No Added Salt and Low Carb.     Medications:   Patient is currently taking over-the-counter supplements. OTC medications include: see medication list. Patient is able to manage medications.     Activities of Daily Living (ADLs)/Instrumental Activities of Daily Living (IADLs):   Walk and transfer into and out of bed and chair?: Yes  Dress and groom yourself?: Yes    Bathe or shower yourself?: Yes    Feed yourself? Yes  Do your laundry/housekeeping?: Yes  Manage your money, pay your bills and track your expenses?: Yes  Make your own meals?: Yes    Do your own shopping?: Yes    Previous Hospitalizations:   Any hospitalizations or ED visits within the last 12 months?: No      Advance Care Planning:   Living will: Yes    Durable POA for healthcare: Yes    Advanced directive: Yes    Advanced directive counseling given: Yes    End of Life Decisions reviewed with patient: Yes      Cognitive Screening:   Provider or family/friend/caregiver concerned regarding cognition?:  No    Preventive Screenings      Cardiovascular Screening:    General: Screening Not Indicated and History Lipid Disorder      Diabetes Screening:     General: Screening Not Indicated and History Diabetes      Colorectal Cancer Screening:     General: Screening Current      Breast Cancer Screening:     General: Screening Current      Cervical Cancer Screening:    General: Screening Not Indicated      Abdominal Aortic Aneurysm (AAA) Screening:        General: Screening Not Indicated      Lung Cancer Screening:     General: Screening Not Indicated      Hepatitis C Screening:    General: Screening Current    Immunizations:  - Immunizations due: Zoster (Shingrix)  - Risks/benefits immunizations discussed      Screening, Brief Intervention, and Referral to Treatment (SBIRT)     Screening  Typical number of drinks in a day: 0    AUDIT-C Screenin) How often did you have a drink containing alcohol in the past year? monthly or less  2) How many drinks did you have on a typical day when you were drinking in the past year? 1 to 2  3) How often did you have 6 or more drinks on one occasion in the past year? never    AUDIT-C Score: 1  Interpretation: Score 0-2 (female): Negative screen for alcohol misuse    Single Item Drug Screening:  How often have you used an illegal drug (including marijuana) or a prescription medication for non-medical reasons in the past year? never    Single Item Drug Screen Score: 0  Interpretation: Negative screen for possible drug use disorder    Social Drivers of Health     Financial Resource Strain: Low Risk  (2023)    Overall Financial Resource Strain (CARDIA)    • Difficulty of Paying Living Expenses: Not hard at all   Food Insecurity: No Food Insecurity (2025)    Nursing - Inadequate Food Risk Classification    • Worried About Running Out of Food in the Last Year: Never true    • Ran Out of Food in the Last Year: Never true   Transportation Needs: No Transportation Needs (2025)  "   PRAPARE - Transportation    • Lack of Transportation (Medical): No    • Lack of Transportation (Non-Medical): No   Housing Stability: Low Risk  (5/2/2025)    Housing Stability Vital Sign    • Unable to Pay for Housing in the Last Year: No    • Number of Times Moved in the Last Year: 1    • Homeless in the Last Year: No   Utilities: Not At Risk (5/2/2025)    [facility] Utilities    • Threatened with loss of utilities: No     No results found.    Objective   /70   Pulse 81   Temp 97.5 °F (36.4 °C) (Temporal)   Ht 5' 5.75\" (1.67 m)   Wt 90.5 kg (199 lb 9.6 oz)   SpO2 97%   BMI 32.46 kg/m²   Physical Exam    Physical Exam  Constitutional:       General: She is not in acute distress.     Appearance: Normal appearance. She is well-developed.   HENT:      Head: Normocephalic.      Right Ear: Tympanic membrane normal.      Left Ear: Tympanic membrane normal.      Nose: Nose normal.      Mouth/Throat:      Mouth: Mucous membranes are moist.     Eyes:      Conjunctiva/sclera: Conjunctivae normal.      Pupils: Pupils are equal, round, and reactive to light.     Neck:      Vascular: No carotid bruit.     Cardiovascular:      Rate and Rhythm: Normal rate and regular rhythm.      Pulses: no weak pulses.           Dorsalis pedis pulses are 2+ on the right side and 2+ on the left side.        Posterior tibial pulses are 1+ on the right side and 1+ on the left side.      Heart sounds: No murmur heard.  Pulmonary:      Effort: Pulmonary effort is normal.      Breath sounds: Normal breath sounds.   Abdominal:      General: Bowel sounds are normal.      Palpations: Abdomen is soft. There is no mass.      Tenderness: There is no abdominal tenderness.   Feet:      Right foot:      Skin integrity: Erythema present. No ulcer, skin breakdown, warmth, callus or dry skin.      Left foot:      Skin integrity: No ulcer, skin breakdown, erythema, warmth, callus or dry skin.     Skin:     Findings: No rash.     Neurological:      Mental " Status: She is alert and oriented to person, place, and time.      Deep Tendon Reflexes: Reflexes are normal and symmetric.     Psychiatric:         Mood and Affect: Mood normal.         Behavior: Behavior normal.         Thought Content: Thought content normal.         Judgment: Judgment normal.

## 2025-05-02 NOTE — ASSESSMENT & PLAN NOTE
Lab Results   Component Value Date    HGBA1C 7.9 (H) 04/29/2025   Patient is continued on her excellent efforts at dietary and fitness and weight loss.She will continue on Actos plus met    Orders:  •  IRIS Diabetic eye exam

## 2025-05-02 NOTE — ASSESSMENT & PLAN NOTE
Patient's labs are supratherapeutic.  Decrease from 200 mcg down to 150 mcg.  Recheck TSH and T4 in 3 months.  Orders:  •  levothyroxine 150 mcg tablet; Take 1 tablet (150 mcg total) by mouth daily  •  TSH, 3rd generation; Future  •  T4, free; Future

## 2025-05-02 NOTE — Clinical Note
Will be following up with you in 6 months.  Patient was supratherapeutic with TSH , levothyroxine decreased from 200 mcg down to 150 mcg with labs to follow-up in 3 months

## 2025-05-06 ENCOUNTER — TELEPHONE (OUTPATIENT)
Dept: FAMILY MEDICINE CLINIC | Facility: CLINIC | Age: 70
End: 2025-05-06

## 2025-05-06 NOTE — TELEPHONE ENCOUNTER
Lindy from [employer] called and is wondering if a statin would be beneficial for the patient due to her diabetes recommendation. Please advice       Lindy number ( [phone] ext [phone])

## 2025-05-09 NOTE — TELEPHONE ENCOUNTER
"Patient has been on fenofibrate transferred here from her previous PCP.  You can certainly ask if she would be interested in going on a statin \"diabetic protocol \".  "

## 2025-05-13 DIAGNOSIS — I10 PRIMARY HYPERTENSION: ICD-10-CM

## 2025-05-13 RX ORDER — LISINOPRIL AND HYDROCHLOROTHIAZIDE 10; 12.5 MG/1; MG/1
1 TABLET ORAL DAILY
Qty: 90 TABLET | Refills: 1 | Status: SHIPPED | OUTPATIENT
Start: 2025-05-13

## 2025-05-30 ENCOUNTER — RESULTS FOLLOW-UP (OUTPATIENT)
Dept: FAMILY MEDICINE CLINIC | Facility: CLINIC | Age: 70
End: 2025-05-30

## 2025-05-30 NOTE — RESULT ENCOUNTER NOTE
"I do want to confirm when patient was here for her visit in early May that we did recommend cutting back down on her thyroid medication to skipping 2 days a week because her labs did show that she was \"super\" therapeutic???  "

## 2025-05-30 NOTE — TELEPHONE ENCOUNTER
"----- Message from Mylene Hua DO sent at 5/30/2025 12:20 AM EDT -----  I do want to confirm when patient was here for her visit in early May that we did recommend cutting back down on her thyroid medication to skipping 2 days a week because her labs did show that she   was \"super\" therapeutic???  ----- Message -----  From: Cristóbal Conroy Orders/Results  Sent: 4/29/2025   1:09 PM EDT  To: Mylene Hua DO    "

## 2025-05-30 NOTE — TELEPHONE ENCOUNTER
Called Elodia and left message asking her to call the office back to confirm that she cut her thyroid medication back to 5 days a week, skipping 2 days since her last lab test showed she was super therapeutic.

## 2025-06-11 DIAGNOSIS — E78.2 MODERATE MIXED HYPERLIPIDEMIA NOT REQUIRING STATIN THERAPY: ICD-10-CM

## 2025-06-11 NOTE — TELEPHONE ENCOUNTER
Requested medication(s) are due for refill today: Yes  **If antibiotic or given during sick visit, contact patient to discuss current symptoms.   **Confirm prescribing provider    LOV:  5/2/25  **If longer then 1 year, contact patient to schedule annual PRIOR to refilling. Once scheduled, adjust refill for 30 days, no refills.  **Update CareEverywhere to confirm not being seen elsewhere    NOV:  11/18/25    Is patient due for annual visit: No  **If future appointment, adjust to annual/follow up.  ** No appointment call to schedule annual/follow up.    Route to PCP, unless PCP no longer here, then physician they are seeing next.

## 2025-06-12 DIAGNOSIS — E78.00 PURE HYPERCHOLESTEROLEMIA: Primary | ICD-10-CM

## 2025-06-12 RX ORDER — FENOFIBRATE 160 MG/1
160 TABLET ORAL DAILY
Qty: 90 TABLET | Refills: 1 | Status: SHIPPED | OUTPATIENT
Start: 2025-06-12

## 2025-06-18 DIAGNOSIS — E11.9 TYPE 2 DIABETES MELLITUS WITHOUT COMPLICATION, WITHOUT LONG-TERM CURRENT USE OF INSULIN (HCC): ICD-10-CM

## 2025-06-18 RX ORDER — PIOGLITAZONE HCL AND METFORMIN HCL 500; 15 MG/1; MG/1
1 TABLET ORAL EVERY MORNING
Qty: 90 TABLET | Refills: 1 | Status: SHIPPED | OUTPATIENT
Start: 2025-06-18

## 2025-06-24 LAB
CHOLEST SERPL-MCNC: 226 MG/DL
CHOLEST SERPL-MCNC: 226 MG/DL
CHOLEST/HDLC SERPL: 3.1 {RATIO}
CHOLEST/HDLC SERPL: 3.1 {RATIO}
HDLC SERPL-MCNC: 72 MG/DL (ref 23–92)
HDLC SERPL-MCNC: 72 MG/DL (ref 23–92)
LDLC SERPL CALC-MCNC: 129 MG/DL
LDLC SERPL CALC-MCNC: 129 MG/DL
NONHDLC SERPL-MCNC: 154 MG/DL
NONHDLC SERPL-MCNC: 154 MG/DL
T4 FREE SERPL-MCNC: 1.16 NG/DL (ref 0.61–1.12)
T4 FREE SERPL-MCNC: 1.16 NG/DL (ref 0.61–1.12)
TRIGL SERPL-MCNC: 125 MG/DL
TRIGL SERPL-MCNC: 125 MG/DL
TSH SERPL-ACNC: 1.54 UIU/ML (ref 0.45–5.33)
TSH SERPL-ACNC: 1.54 UIU/ML (ref 0.45–5.33)

## 2025-06-26 ENCOUNTER — HOSPITAL ENCOUNTER (OUTPATIENT)
Dept: BONE DENSITY | Facility: MEDICAL CENTER | Age: 70
Discharge: HOME | End: 2025-06-26
Payer: COMMERCIAL

## 2025-06-26 VITALS — HEIGHT: 66 IN | WEIGHT: 199 LBS | BODY MASS INDEX: 31.98 KG/M2

## 2025-06-26 DIAGNOSIS — Z13.820 ENCOUNTER FOR OSTEOPOROSIS SCREENING IN ASYMPTOMATIC POSTMENOPAUSAL PATIENT: ICD-10-CM

## 2025-06-26 DIAGNOSIS — Z78.0 ENCOUNTER FOR OSTEOPOROSIS SCREENING IN ASYMPTOMATIC POSTMENOPAUSAL PATIENT: ICD-10-CM

## 2025-06-26 PROCEDURE — 77080 DXA BONE DENSITY AXIAL: CPT

## 2025-06-27 ENCOUNTER — RESULTS FOLLOW-UP (OUTPATIENT)
Dept: FAMILY MEDICINE CLINIC | Facility: CLINIC | Age: 70
End: 2025-06-27

## 2025-06-27 NOTE — TELEPHONE ENCOUNTER
Left message for pt that her LDL cholesterol is not at goal and to try and follow a low cholesterol diet as well as exercise. Taylor will discuss further at her next appt.

## 2025-07-01 ENCOUNTER — RESULTS FOLLOW-UP (OUTPATIENT)
Dept: FAMILY MEDICINE CLINIC | Facility: CLINIC | Age: 70
End: 2025-07-01